# Patient Record
Sex: MALE | Race: ASIAN | NOT HISPANIC OR LATINO | URBAN - METROPOLITAN AREA
[De-identification: names, ages, dates, MRNs, and addresses within clinical notes are randomized per-mention and may not be internally consistent; named-entity substitution may affect disease eponyms.]

---

## 2019-11-12 ENCOUNTER — EMERGENCY (EMERGENCY)
Facility: HOSPITAL | Age: 64
LOS: 1 days | Discharge: HOME | End: 2019-11-12
Attending: EMERGENCY MEDICINE
Payer: COMMERCIAL

## 2019-11-12 VITALS
RESPIRATION RATE: 16 BRPM | WEIGHT: 169.98 LBS | HEART RATE: 67 BPM | HEIGHT: 72 IN | TEMPERATURE: 98 F | OXYGEN SATURATION: 97 % | SYSTOLIC BLOOD PRESSURE: 181 MMHG | DIASTOLIC BLOOD PRESSURE: 91 MMHG

## 2019-11-12 LAB
ALBUMIN SERPL ELPH-MCNC: 4.1 G/DL — SIGNIFICANT CHANGE UP (ref 3.3–5)
ALP SERPL-CCNC: 65 U/L — SIGNIFICANT CHANGE UP (ref 40–120)
ALT FLD-CCNC: 30 U/L — SIGNIFICANT CHANGE UP (ref 10–45)
ANION GAP SERPL CALC-SCNC: 10 MMOL/L — SIGNIFICANT CHANGE UP (ref 5–17)
APTT BLD: 28.6 SEC — SIGNIFICANT CHANGE UP (ref 27.5–36.3)
AST SERPL-CCNC: 27 U/L — SIGNIFICANT CHANGE UP (ref 10–40)
BASOPHILS # BLD AUTO: 0.05 K/UL — SIGNIFICANT CHANGE UP (ref 0–0.2)
BASOPHILS NFR BLD AUTO: 0.9 % — SIGNIFICANT CHANGE UP (ref 0–2)
BILIRUB SERPL-MCNC: 0.4 MG/DL — SIGNIFICANT CHANGE UP (ref 0.2–1.2)
BUN SERPL-MCNC: 16 MG/DL — SIGNIFICANT CHANGE UP (ref 7–23)
CALCIUM SERPL-MCNC: 9 MG/DL — SIGNIFICANT CHANGE UP (ref 8.4–10.5)
CHLORIDE SERPL-SCNC: 104 MMOL/L — SIGNIFICANT CHANGE UP (ref 96–108)
CHOLEST SERPL-MCNC: 167 MG/DL — SIGNIFICANT CHANGE UP (ref 10–199)
CO2 SERPL-SCNC: 24 MMOL/L — SIGNIFICANT CHANGE UP (ref 22–31)
CREAT SERPL-MCNC: 1.01 MG/DL — SIGNIFICANT CHANGE UP (ref 0.5–1.3)
EOSINOPHIL # BLD AUTO: 0.2 K/UL — SIGNIFICANT CHANGE UP (ref 0–0.5)
EOSINOPHIL NFR BLD AUTO: 3.7 % — SIGNIFICANT CHANGE UP (ref 0–6)
GLUCOSE SERPL-MCNC: 92 MG/DL — SIGNIFICANT CHANGE UP (ref 70–99)
HBA1C BLD-MCNC: 5.4 % — SIGNIFICANT CHANGE UP (ref 4–5.6)
HCT VFR BLD CALC: 43.2 % — SIGNIFICANT CHANGE UP (ref 39–50)
HDLC SERPL-MCNC: 29 MG/DL — LOW
HGB BLD-MCNC: 14.7 G/DL — SIGNIFICANT CHANGE UP (ref 13–17)
IMM GRANULOCYTES NFR BLD AUTO: 0.2 % — SIGNIFICANT CHANGE UP (ref 0–1.5)
INR BLD: 1 RATIO — SIGNIFICANT CHANGE UP (ref 0.88–1.16)
LIPID PNL WITH DIRECT LDL SERPL: 93 MG/DL — SIGNIFICANT CHANGE UP
LYMPHOCYTES # BLD AUTO: 2.34 K/UL — SIGNIFICANT CHANGE UP (ref 1–3.3)
LYMPHOCYTES # BLD AUTO: 42.8 % — SIGNIFICANT CHANGE UP (ref 13–44)
MAGNESIUM SERPL-MCNC: 2.3 MG/DL — SIGNIFICANT CHANGE UP (ref 1.6–2.6)
MCHC RBC-ENTMCNC: 26.9 PG — LOW (ref 27–34)
MCHC RBC-ENTMCNC: 34 GM/DL — SIGNIFICANT CHANGE UP (ref 32–36)
MCV RBC AUTO: 79 FL — LOW (ref 80–100)
MONOCYTES # BLD AUTO: 0.58 K/UL — SIGNIFICANT CHANGE UP (ref 0–0.9)
MONOCYTES NFR BLD AUTO: 10.6 % — SIGNIFICANT CHANGE UP (ref 2–14)
NEUTROPHILS # BLD AUTO: 2.29 K/UL — SIGNIFICANT CHANGE UP (ref 1.8–7.4)
NEUTROPHILS NFR BLD AUTO: 41.8 % — LOW (ref 43–77)
NRBC # BLD: 0 /100 WBCS — SIGNIFICANT CHANGE UP (ref 0–0)
PHOSPHATE SERPL-MCNC: 3 MG/DL — SIGNIFICANT CHANGE UP (ref 2.5–4.5)
PLATELET # BLD AUTO: 220 K/UL — SIGNIFICANT CHANGE UP (ref 150–400)
POTASSIUM SERPL-MCNC: 4.4 MMOL/L — SIGNIFICANT CHANGE UP (ref 3.5–5.3)
POTASSIUM SERPL-SCNC: 4.4 MMOL/L — SIGNIFICANT CHANGE UP (ref 3.5–5.3)
PROT SERPL-MCNC: 6.8 G/DL — SIGNIFICANT CHANGE UP (ref 6–8.3)
PROTHROM AB SERPL-ACNC: 11.5 SEC — SIGNIFICANT CHANGE UP (ref 10–12.9)
RBC # BLD: 5.47 M/UL — SIGNIFICANT CHANGE UP (ref 4.2–5.8)
RBC # FLD: 13.8 % — SIGNIFICANT CHANGE UP (ref 10.3–14.5)
SODIUM SERPL-SCNC: 138 MMOL/L — SIGNIFICANT CHANGE UP (ref 135–145)
TOTAL CHOLESTEROL/HDL RATIO MEASUREMENT: 5.8 RATIO — SIGNIFICANT CHANGE UP (ref 3.4–9.6)
TRIGL SERPL-MCNC: 223 MG/DL — HIGH (ref 10–149)
TROPONIN T, HIGH SENSITIVITY RESULT: 7 NG/L — SIGNIFICANT CHANGE UP (ref 0–51)
TROPONIN T, HIGH SENSITIVITY RESULT: 9 NG/L — SIGNIFICANT CHANGE UP (ref 0–51)
WBC # BLD: 5.47 K/UL — SIGNIFICANT CHANGE UP (ref 3.8–10.5)
WBC # FLD AUTO: 5.47 K/UL — SIGNIFICANT CHANGE UP (ref 3.8–10.5)

## 2019-11-12 PROCEDURE — 99220: CPT

## 2019-11-12 PROCEDURE — 93010 ELECTROCARDIOGRAM REPORT: CPT

## 2019-11-12 PROCEDURE — 71046 X-RAY EXAM CHEST 2 VIEWS: CPT | Mod: 26

## 2019-11-12 RX ORDER — ASPIRIN/CALCIUM CARB/MAGNESIUM 324 MG
324 TABLET ORAL ONCE
Refills: 0 | Status: COMPLETED | OUTPATIENT
Start: 2019-11-12 | End: 2019-11-12

## 2019-11-12 RX ADMIN — Medication 324 MILLIGRAM(S): at 10:39

## 2019-11-12 NOTE — ED CDU PROVIDER INITIAL DAY NOTE - ATTENDING CONTRIBUTION TO CARE
Chest pain with risk factors for ACS, will observe on telemetry, stress test, re-evaluate. Matias Sandhu M.D.

## 2019-11-12 NOTE — ED PROVIDER NOTE - OBJECTIVE STATEMENT
63 y/o M  denies PMH presents with chest pain since Sunday. States that pain is located on the L sided of chest without radiation to jaw/arm/shoulder. States pain come and goes, lasting hours at a times. Endorse that pain is worse with left arm movement and exertion. Patient has not taken medication for pain.   Denies SOB, leg swelling, N, V, fever, trauma to chest, lifting heavy objects (although states that he goes to the gym), recent travel, swelling/pain in calves.   Former smoker, quit 20+ years ago.   States that he had blood work drawn 1+ year ago, which was "normal." Has not seen cardiologist before, denies having a PCP.

## 2019-11-12 NOTE — ED CDU PROVIDER INITIAL DAY NOTE - PROGRESS NOTE DETAILS
CDU NOTE JASEN Malik: pt resting comfortably, feels well without complaint. NAD recent VSS. no events on tele. CDU PROGRESS NOTE JASEN POWELL: Received pt at 1900 sign-out. Pt resting in stretcher in NAD. Case/plan reviewed. VSS. Pt aox3, ambulatory around unit with steady gait. S1 S2 noted, RRR, lungs CTA b/l, BS x4 with soft, nontender abdomen. Pt reports having 4/10 chest discomfort described as "Gas like". Pt declines pain medication. No events on telemetry, Troponin x 2 negative.  Will continue to monitor overnight. CDU PROGRESS NOTE PA SHERRY: Pt resting comfortably, NAD, VSS. No events on telemetry. Pt denies chest pain currently and states symptoms has subsided. Will continue to monitor.

## 2019-11-12 NOTE — ED ADULT NURSE NOTE - OBJECTIVE STATEMENT
Chief Complaint: chest pain.    · Chief Complaint: The patient is a 64y Male complaining of chest pain.  · HPI Objective Statement: 63 y/o M  denies PMH presents with chest pain since Sunday. States that pain is located on the L sided of chest without radiation to jaw/arm/shoulder. States pain come and goes, lasting hours at a times. Endorse that pain is worse with left arm movement and exertion. Patient has not taken medication for pain.   	Denies SOB, leg swelling, N, V, fever, trauma to chest, lifting heavy objects (although states that he goes to the gym), recent travel, swelling/pain in calves.   	Former smoker, quit 20+ years ago.   States that he had blood work drawn 1+ year ago, which was "normal." Has not seen cardiologist before, denies having a PCP. 65 y/o male ,  denies PMH, c/o  chest pain since Sunday. States that pain is located on the L sided of chest without radiation to jaw/arm/shoulder.  Pain intermittent, lasting hours at a times. Worsening pain with movement of LUE. Has not taken any medication for the pain. Denies SOB, leg swelling, N, V, fever, trauma to chest, lifting heavy objects . Denies recent travel, swelling/pain in calves.   Hx smoking Quit x 20 years ago.

## 2019-11-12 NOTE — ED PROVIDER NOTE - NS ED ROS FT
Constitutional: No fever or chills  Eyes: No visual changes  CV: +chest pain No lower extremity edema  Resp: No SOB no cough  GI: No abd pain. No nausea or vomiting.   : No dysuria, hematuria.   MSK: No musculoskeletal pain  Skin: No rash  Psych: No complaints   Neuro: No headache. No numbness or tingling.

## 2019-11-12 NOTE — ED PROVIDER NOTE - ATTENDING CONTRIBUTION TO CARE
Patient presenting complaining of L sided chest pains for last three days.  Pain is sharp, sometimes radiates to L arm.  Notices that exertion can bring it on.  No known cardiac disease. No associated diaphoresis, nausea, vomiting.  Does not follow with doctors regularly, no cardiac testing in past, on no medications.  Former smoker, quit many years ago.  Currently chest pain free.    A 14 point review of systems is negative except as in HPI or otherwise documented.    Exam:  General: Patient well appearing, hypertensive otherwise vital signs within normal limits  HEENT: airway patent with moist mucous membranes  Cardiac: RRR S1/S2 with strong peripheral pulses  Respiratory: lungs clear without respiratory distress  GI: abdomen soft, non tender, non distended  Neuro: no gross neurologic deficits  Skin: warm, well perfused  Psych: normal mood and affect    Patient presenting with moderately concerning story for cardiac chest pain given exertional component and radiation of pain, has risk factors of age and prior smoking, as well as possible undiagnosed hypertension given vital signs on arrival - plan for labs, troponin to r/o ACS, EKG non diangostic, likely admission versus observation for further cardiac workup.

## 2019-11-12 NOTE — ED ADULT NURSE REASSESSMENT NOTE - NS ED NURSE REASSESS COMMENT FT1
Pt report received from  Mikayla BINGHAM. Pt transferred to cdu Bed 7 for NucStress . Pt a/ox3 denies respiratory distress, sob, dyspnea, C/P, palpitations, n/v/d at this time. Pt states symptoms have improved. VSS, afebrile, IV clean/dry/intact. Pt aware of plan of care, call bell within reach ,safety maintained. Will monitor and assess.

## 2019-11-12 NOTE — ED ADULT NURSE REASSESSMENT NOTE - NS ED NURSE REASSESS COMMENT FT1
Pt received from OTILIA Ross. Pt oriented to CDU & plan of care was discussed. Pt endorses 4/10 chest pain described as the feeling of gas. Pt states pain is improved Pt denies any SOB, dizziness or palpitations. Safety & comfort measures maintained. Call bell in reach. Will continue to monitor. Pt received from OTILIA Ross. Pt oriented to CDU & plan of care was discussed. Pt endorses 4/10 chest pain described as the feeling of gas. PA Hi aware. Pt does not want any pain meds at this time. Pt states pain is improved from previously. Pt denies any SOB, dizziness or palpitations. Safety & comfort measures maintained. Call bell in reach. Will continue to monitor.

## 2019-11-12 NOTE — ED CDU PROVIDER INITIAL DAY NOTE - OBJECTIVE STATEMENT
63 y/o M  denies PMH presents with chest pain since Sunday. States that pain is non-exertional and is located on the L sided of chest without radiation to jaw/arm/shoulder. States pain come and goes, lasting hours at a times. Endorse that pain is worse with left arm movement. Patient has not taken medication for pain.   Denies SOB, leg swelling, N, V, fever, trauma to chest, lifting heavy objects (although states that he goes to the gym), recent travel, swelling/pain in calves.   Former smoker, quit 20+ years ago.   States that he had blood work drawn 1+ year ago, which was "normal."   PCP Fernando

## 2019-11-12 NOTE — ED ADULT NURSE NOTE - NSIMPLEMENTINTERV_GEN_ALL_ED
Implemented All Universal Safety Interventions:  [place] to call system. Call bell, personal items and telephone within reach. Instruct patient to call for assistance. Room bathroom lighting operational. Non-slip footwear when patient is off stretcher. Physically safe environment: no spills, clutter or unnecessary equipment. Stretcher in lowest position, wheels locked, appropriate side rails in place.

## 2019-11-12 NOTE — ED PROVIDER NOTE - PHYSICAL EXAMINATION
GEN: Well Appearing, Nontoxic, NAD  HEENT: NC/AT, Symm Facies.  EOMI  CV: No JVD/Bruits or stridor;  +S1S2, RRR w/o m/g/r  RESP: CTAB w/o w/r/r  ABD: Soft, nt/nd  EXT/MSK: No lower extremity edema or calf tenderness. FROMx4  SKIN: No erythema, lesions or rash  Neuro: Grossly intact, AOX3 with normal speech, Sensation intact. Gait normal

## 2019-11-13 VITALS
OXYGEN SATURATION: 98 % | TEMPERATURE: 98 F | HEART RATE: 65 BPM | RESPIRATION RATE: 18 BRPM | SYSTOLIC BLOOD PRESSURE: 153 MMHG | DIASTOLIC BLOOD PRESSURE: 86 MMHG

## 2019-11-13 PROCEDURE — 85610 PROTHROMBIN TIME: CPT

## 2019-11-13 PROCEDURE — 85730 THROMBOPLASTIN TIME PARTIAL: CPT

## 2019-11-13 PROCEDURE — 84484 ASSAY OF TROPONIN QUANT: CPT

## 2019-11-13 PROCEDURE — 83735 ASSAY OF MAGNESIUM: CPT

## 2019-11-13 PROCEDURE — 78452 HT MUSCLE IMAGE SPECT MULT: CPT | Mod: 26

## 2019-11-13 PROCEDURE — 93016 CV STRESS TEST SUPVJ ONLY: CPT

## 2019-11-13 PROCEDURE — 99284 EMERGENCY DEPT VISIT MOD MDM: CPT | Mod: 25

## 2019-11-13 PROCEDURE — 71046 X-RAY EXAM CHEST 2 VIEWS: CPT

## 2019-11-13 PROCEDURE — 80053 COMPREHEN METABOLIC PANEL: CPT

## 2019-11-13 PROCEDURE — 78452 HT MUSCLE IMAGE SPECT MULT: CPT

## 2019-11-13 PROCEDURE — 83036 HEMOGLOBIN GLYCOSYLATED A1C: CPT

## 2019-11-13 PROCEDURE — 80061 LIPID PANEL: CPT

## 2019-11-13 PROCEDURE — G0378: CPT

## 2019-11-13 PROCEDURE — 99217: CPT

## 2019-11-13 PROCEDURE — 93018 CV STRESS TEST I&R ONLY: CPT

## 2019-11-13 PROCEDURE — 85027 COMPLETE CBC AUTOMATED: CPT

## 2019-11-13 PROCEDURE — 84100 ASSAY OF PHOSPHORUS: CPT

## 2019-11-13 PROCEDURE — 99284 EMERGENCY DEPT VISIT MOD MDM: CPT

## 2019-11-13 PROCEDURE — 93005 ELECTROCARDIOGRAM TRACING: CPT

## 2019-11-13 PROCEDURE — A9500: CPT

## 2019-11-13 PROCEDURE — 93017 CV STRESS TEST TRACING ONLY: CPT

## 2019-11-13 RX ORDER — ATORVASTATIN CALCIUM 80 MG/1
1 TABLET, FILM COATED ORAL
Qty: 7 | Refills: 0
Start: 2019-11-13 | End: 2019-11-19

## 2019-11-13 RX ORDER — ASPIRIN/CALCIUM CARB/MAGNESIUM 324 MG
1 TABLET ORAL
Qty: 7 | Refills: 0
Start: 2019-11-13 | End: 2019-11-19

## 2019-11-13 RX ORDER — METOPROLOL TARTRATE 50 MG
1 TABLET ORAL
Qty: 7 | Refills: 0
Start: 2019-11-13 | End: 2019-11-19

## 2019-11-13 RX ORDER — ISOSORBIDE MONONITRATE 60 MG/1
1 TABLET, EXTENDED RELEASE ORAL
Qty: 7 | Refills: 0
Start: 2019-11-13 | End: 2019-11-19

## 2019-11-13 RX ORDER — FAMOTIDINE 10 MG/ML
1 INJECTION INTRAVENOUS
Qty: 14 | Refills: 0
Start: 2019-11-13 | End: 2019-11-19

## 2019-11-13 NOTE — ED ADULT NURSE REASSESSMENT NOTE - NS ED NURSE REASSESS COMMENT FT1
Pt discharged as per provider. Pt verbalizes understanding to discharge orders & will follow up with PMD post discharge. IV lock removed. No bleeding noted. Pt stable upon discharge.

## 2019-11-13 NOTE — ED ADULT NURSE REASSESSMENT NOTE - NSIMPLEMENTINTERV_GEN_ALL_ED
Implemented All Universal Safety Interventions:  [place] to call system. Call bell, personal items and telephone within reach. Instruct patient to call for assistance. Room bathroom lighting operational. Non-slip footwear when patient is off stretcher. Physically safe environment: no spills, clutter or unnecessary equipment. Stretcher in lowest position, wheels locked, appropriate side rails in place.

## 2019-11-13 NOTE — ED CDU PROVIDER DISPOSITION NOTE - PATIENT PORTAL LINK FT
You can access the FollowMyHealth Patient Portal offered by [facility] by registering at the following website: [email or web address]. By joining [employer]’s FollowMyHealth portal, you will also be able to view your health information using other applications (apps) compatible with our system.

## 2019-11-13 NOTE — ED ADULT NURSE REASSESSMENT NOTE - NS ED NURSE REASSESS COMMENT FT1
Comments: 07.00 Am Pt received from OTILIA Rousseau Pt is observed for chest pain for nuclear stress test .Pt A&OX 4  . Pt still C/O chest discomfort 3/10. ., SOB, dizziness or palpitations N/V/D fever chills .. Safety & comfort measures maintained. Call bell in reach. IV site looks clean & dry   No infiltration noted Will continue to monitor.  09.00 Am pt went for nuclear stress test

## 2019-11-13 NOTE — ED CDU PROVIDER SUBSEQUENT DAY NOTE - PROGRESS NOTE DETAILS
CDU PROGRESS NOTE PA SHERRY: Pt resting comfortably, NAD, VSS. No events on telemetry. CDU NOTE JASEN MEYERS: NAD VSS.  Patient resting comfortably and has no current complaints. no events on tele. discussed plan for day with patient re: stress test. pt con't c/o mild chest pain, but no other sympt.  Awaiting Nuc Stress Test. patient at tania Michel PA-C patient at stress - Natalie Michel PA-C CDU NOTE JASEN MEYERS: NAD VSS.  Patient resting comfortably and has no current complaints. no events on tele. awaiting stress results paged cardiology regarding abn stress. - Natalie Michel PA-C CDU NOTE JASEN GOTTI: Pt resting comfortably, feeling well without complaint. No CP or abd pain. NAD, VSS. PVCs on telemetry. CV: S1S2 RRR, Lungs: CTA b/l. Cardio Dr. Conner saw pt, pt stable for d/c with aspirin and pepcid, if symptoms don't resolve with pepcid trial pt to start imdur. pt to f/u with cardio outpt for TTE. case and plan discussed with Dr. Guzman; agrees with plan; pt stable for discharge. CDU NOTE JASEN GOTTI: Pt resting comfortably, feeling well without complaint. No CP or abd pain. NAD, VSS. PVCs on telemetry. CV: S1S2 RRR, Lungs: CTA b/l. Cardio Dr. Conner saw pt, pt stable for d/c with aspirin and pepcid (as symptoms may be GI related not cardiac), if symptoms don't resolve with pepcid trial pt to start imdur. pt to f/u with cardio outpt for TTE. case and plan discussed with Dr. Guzman; agrees with plan; pt stable for discharge.

## 2019-11-13 NOTE — ED CDU PROVIDER DISPOSITION NOTE - NSFOLLOWUPINSTRUCTIONS_ED_ALL_ED_FT
1. Follow up with your PMD within 48-72 hours.   You may schedule appointment with Cardiology clinic this week by calling [phone]  2. Show copies of your reports given to you. Take all of your other medications as previously prescribed.   3. Worsening or continued chest pain, shortness of breath, weakness, return to ED. 1. Follow up with your PMD within 48-72 hours. Follow up with your cardiologist or our Cardiology clinic this week by calling [phone]. Recommend echocardiogram. Show copies of your reports given to you.   2. Start Aspirin 81mg daily and Pepcid 20mg 2x a day. If your pain does NOT resolve with pepcid, start Imdur 30mg daily.   3. Worsening or continued chest pain, shortness of breath, weakness, return to ED.

## 2019-11-13 NOTE — ED ADULT NURSE REASSESSMENT NOTE - COMFORT CARE
meal provided/plan of care explained/po fluids offered
po fluids offered/plan of care explained
ambulated to bathroom

## 2019-11-13 NOTE — ED CDU PROVIDER SUBSEQUENT DAY NOTE - HISTORY
CDU PROGRESS NOTE PA SHERRY: Pt resting comfortably, feeling well without complaint. NAD, No events on telemetry.

## 2019-11-13 NOTE — ED ADULT NURSE REASSESSMENT NOTE - NS ED NURSE REASSESS COMMENT FT1
Pt received from OTILIA Betancourt. Pt oriented to CDU & plan of care was discussed. Pt denies any chest pain, SOB, dizziness or palpitations. Pt states all symptoms have resolved. Safety & comfort measures maintained. Call bell in reach. Will continue to monitor.

## 2019-11-13 NOTE — ED CDU PROVIDER DISPOSITION NOTE - CLINICAL COURSE
65 y/o M  denies PMH presents with chest pain since Sunday. States that pain is non-exertional and is located on the L sided of chest without radiation to jaw/arm/shoulder. States pain come and goes, lasting hours at a times. Endorse that pain is worse with left arm movement. Patient has not taken medication for pain. Denies SOB, leg swelling, N, V, fever, trauma to chest, lifting heavy objects (although states that he goes to the gym), recent travel, swelling/pain in calves.   In ED, patient had ekg no signs of acute ischemia, troponin x 2 negative, chest x ray no signs of acute pathology. Pt sent to CDU for frequent reeval, vitals q 4hrs, telemetry monitoring and Nuclear stress. 63 y/o M  denies PMH presents with chest pain since Sunday. States that pain is non-exertional and is located on the L sided of chest without radiation to jaw/arm/shoulder. States pain come and goes, lasting hours at a times. Endorse that pain is worse with left arm movement. Patient has not taken medication for pain. Denies SOB, leg swelling, N, V, fever, trauma to chest, lifting heavy objects (although states that he goes to the gym), recent travel, swelling/pain in calves.   In ED, patient had ekg no signs of acute ischemia, troponin x 2 negative, chest x ray no signs of acute pathology. Pt sent to CDU for frequent reeval, vitals q 4hrs, telemetry monitoring and Nuclear stress.  In CDU, stress test showed medium-sized, moderate defects in the inferior, basal septal, and basal inferolateral walls that are mostly fixed suggestive of infarct with mild mervat-infarct ischemia. cardiology consulted. pts pain resolved. pt cleared by cardiology for discharge and outpt f/u.

## 2019-11-13 NOTE — ED CDU PROVIDER DISPOSITION NOTE - ATTENDING CONTRIBUTION TO CARE
Chest pain - Nuc Stress Test, cardiac monitor, reassess. Patient seen and examined at bedside.  Case d/w CDU PA.  Labs/imaging/ekg reviewed, cards cs appreciated.  Likely gastroesophageal reflux disease, doubt acute coronary syndrome, no indication for further w/u or treatment in CDU or inpatient.  Recommended pepcid 20 x 2 wk, start imdur if symptoms persist.  Will f/u c Dr. Arrington (cards).  Pt and family given strict return precautions including new/worsening symtoms, SOB, palps, dizziness, etc.  --BMM

## 2019-11-13 NOTE — CONSULT NOTE ADULT - SUBJECTIVE AND OBJECTIVE BOX
Cardiology Attending Consult Note      CHIEF COMPLAINT/REASON FOR CONSULT: chest pain   Date of Admission: 11-12-19    HISTORY OF PRESENT ILLNESS:    64y.o. Male with HL, now admitted with atypical chest pain x 3 days. He reports that over the last year he has had episodes of dull burning and gas like sensation in his chest associated with spicy meals, and alleviated with burping. Over the last several days he has noted similar chest discomfort (dull, burning, non-radiating, not associated with exercise, lasting several hours, 2-3/10), prompting him to come to the ED. Cardiac enzymes were negative, and he subsequently underwent and exercise MPI (11 Mets without chest pain or fatigue), which demonstrated attenuation defects, and predominantly fixed defects with mild-mervat infarct ischemia. At the time of my visit he reports feeling well.  No Chest Pain. No SOB. No HA/Dizziness. No Palpitations. No N/V/D/F/C.        ALLERGIES:  shellfish (Unknown)      MEDICATIONS:                  PAST MEDICAL & SURGICAL HISTORY:  No pertinent past medical history      FAMILY HISTORY:  Family history of acute myocardial infarction (Father)      SOCIAL HISTORY:    Remote former smoker (1/2 PPD x 10 years, quit >20 years ago), no ETOH, no IVDU    REVIEW OF SYSTEMS:    CONSTITUTIONAL: No weakness, fevers or chills  EYES/ENT: No visual changes;  No vertigo or throat pain   NECK: No pain or stiffness  RESPIRATORY: No cough, wheezing, hemoptysis; No shortness of breath  CARDIOVASCULAR: No chest pain or palpitations  GASTROINTESTINAL: No abdominal or epigastric pain. No nausea, vomiting, or hematemesis; No diarrhea or constipation. No melena or hematochezia.  GENITOURINARY: No dysuria, frequency or hematuria  NEUROLOGICAL: No numbness or weakness  SKIN: No itching, burning, rashes, or lesions   All other review of systems is negative unless indicated above.    PHYSICAL EXAM:  T(C): 36.7 (11-13-19 @ 20:00), Max: 36.8 (11-12-19 @ 23:32)  HR: 65 (11-13-19 @ 20:00) (63 - 76)  BP: 153/86 (11-13-19 @ 20:00) (109/65 - 153/86)  RR: 18 (11-13-19 @ 20:00) (18 - 18)  SpO2: 98% (11-13-19 @ 20:00) (97% - 99%)  Wt(kg): --    Drug Dosing Weight  Height (cm): 182.88 (12 Nov 2019 09:41)  Weight (kg): 77.1 (12 Nov 2019 09:41)  BMI (kg/m2): 23.1 (12 Nov 2019 09:41)  BSA (m2): 1.99 (12 Nov 2019 09:41)    I&O's Summary      Appearance: Normal	  HEENT:   Normal oral mucosa, PERRL, EOMI	  Lymphatic: No lymphadenopathy  Cardiovascular: Normal S1 S2, No JVD, No murmurs  Respiratory: Lungs clear to auscultation	  Psychiatry: A & O x 3, Mood & affect appropriate  Gastrointestinal:  Soft, Non-tender, + BS	  Skin: No rashes, No ecchymoses, No cyanosis	  Neurologic: Non-focal  Extremities: Normal range of motion, No clubbing, cyanosis or edema  Vascular: Peripheral pulses palpable 2+ bilaterally    LABS:	 	    CBC Full  -  ( 12 Nov 2019 10:45 )  WBC Count : 5.47 K/uL  Hemoglobin : 14.7 g/dL  Hematocrit : 43.2 %  Platelet Count - Automated : 220 K/uL  Mean Cell Volume : 79.0 fl  Mean Cell Hemoglobin : 26.9 pg  Mean Cell Hemoglobin Concentration : 34.0 gm/dL  Auto Neutrophil # : 2.29 K/uL  Auto Lymphocyte # : 2.34 K/uL  Auto Monocyte # : 0.58 K/uL  Auto Eosinophil # : 0.20 K/uL  Auto Basophil # : 0.05 K/uL  Auto Neutrophil % : 41.8 %  Auto Lymphocyte % : 42.8 %  Auto Monocyte % : 10.6 %  Auto Eosinophil % : 3.7 %  Auto Basophil % : 0.9 %    11-12    138  |  104  |  16  ----------------------------<  92  4.4   |  24  |  1.01    Ca    9.0      12 Nov 2019 10:45  Phos  3.0     11-12  Mg     2.3     11-12    TPro  6.8  /  Alb  4.1  /  TBili  0.4  /  DBili  x   /  AST  27  /  ALT  30  /  AlkPhos  65  11-12    PT/INR - ( 12 Nov 2019 10:45 )   PT: 11.5 sec;   INR: 1.00 ratio         PTT - ( 12 Nov 2019 10:45 )  PTT:28.6 sec  LIVER FUNCTIONS - ( 12 Nov 2019 10:45 )  Alb: 4.1 g/dL / Pro: 6.8 g/dL / ALK PHOS: 65 U/L / ALT: 30 U/L / AST: 27 U/L / GGT: x                   EKG: In the ED: EKG with NSR, no ST or TW changes, PVC, Cardiac enzymes negative x 2      Telemetry: NSR, PVC's    CXR: Clear lungs    TTE: None    Exercise MPI: 11/13/2019:    IMPRESSIONS:Abnormal Study  * Exercise capacity: 11 METS, Excellent for age and  gender.  * Chest Pain: No chest pain with exercise.  * Symptom: Fatigue.  * HR Response: Appropriate.  * BP Response: Appropriate.  * Heart Rhythm: Sinus Rhythm - Rare VPD's, Occassional  VPD's.  * Baseline ECG: Nonspecific ST-T wave abnormality.  * ECG Changes: ST Depression: 0.5 mm upsloping in leads  II, III, aVF, and V4-V6 started at 10:00 min of exercise  at HR of 150 and persisted 01:00 min into recovery.  These  changes did not meet ischemic criteria.  * Arrhythmia: Frequent VPDs occurred during rest, stress  and recovery.  * Review of raw data shows: Minor motion artifact.  * The left ventricle was normal in size. There are  medium-sized, moderate defects in the inferior, basal  septal, and basal inferolateral walls that are mostly  fixed suggestive of infarct with mild mervat-infarct  ischemia.  * There  are medium-sized, mild defects in the anterior,  basal anterolateral, and basal anteroseptal walls that are  mostly fixed, partially corect with prone imaging, and  demonstrate preserved wall motion suggestive of  attenuation artifact.  As these defects did not completely  correct with prone imaging, a small area of scar in this  territory cannot be definitively excluded.  * Post-stress gated wall motion analysis was performed  (LVEF = 65 %;LVEDV = 81 ml.), revealing mild hypokinesis  of the inferior and basal septal walls.  *** No previous Nuclear/Stress exam.    A/P:  64y.o. Male with HL, now admitted with atypical chest pain x 3 days. He reports that over the last year he has had episodes of dull burning and gas like sensation in his chest associated with spicy meals, and alleviated with burping. Over the last several days he has noted similar chest discomfort (dull, burning, non-radiating, not associated with exercise, lasting several hours, 2-3/10), prompting him to come to the ED. Cardiac enzymes were negative, and he subsequently underwent and exercise MPI (11 Mets without chest pain or fatigue), which demonstrated attenuation defects, and predominantly fixed defects with mild-mervat infarct ischemia. At the time of my visit he reports feeling well.  No Chest Pain. No SOB. No HA/Dizziness. No Palpitations. No N/V/D/F/C.      1. Chest pain - Suspect atypical chest pain in the setting of GERD.   -Doubt acute atherothrombotic event as cardiac enzymes negative x 2. EKG without significant ST or TW changes.  -Exercise MPI with area of attenuation artifact, fixed defects in the inferior/inferoseptal/inferolateral wall with mild mervat-infarct ischemia. LVEF preserved at 65%.  -Given his excellent exercise tolerance and no reproducible symptoms, I suspect his most recent chest pain is more likely 2/2 GERD.  -Will attempt trial of therapy for GERD (Pepcid 20 mg po BID) to see if pain is improved  -If no improvement in 2-3 days, would start empiric anti-anginal therapy with Imdur 30 mg po QD  -Start ASA 81 mg po QD  -Discussed with patient and length and recommended that he follow up with a cardiologist within 2-4 weeks for an echocardiogram and consideration for further ischemic workup (alternative stress test vs LHC) if persistent chest pain despite Gerd treatment and anti-anginal therapy.  -From a cardiac standpoint reasonable to DC home with outpatient cardiology follow up.     2. HL - Diet therapy at this time    Thank you for this interesting consult.  	  Zelalem Conner MD  Cardiology Attending  [facility] / [facility]  Cell: [phone]  (Cardiology Nocturnist cell number available 7 pm - 7 am every night; available daytime week days for follow-up only; daytime weekends covered by general cardiology consult service)

## 2022-03-15 ENCOUNTER — OUTPATIENT (OUTPATIENT)
Dept: OUTPATIENT SERVICES | Facility: HOSPITAL | Age: 67
LOS: 1 days | End: 2022-03-15
Payer: MEDICARE

## 2022-03-15 VITALS
WEIGHT: 182.1 LBS | RESPIRATION RATE: 16 BRPM | SYSTOLIC BLOOD PRESSURE: 146 MMHG | HEART RATE: 86 BPM | OXYGEN SATURATION: 97 % | DIASTOLIC BLOOD PRESSURE: 80 MMHG | TEMPERATURE: 98 F | HEIGHT: 70.5 IN

## 2022-03-15 DIAGNOSIS — Z12.9 ENCOUNTER FOR SCREENING FOR MALIGNANT NEOPLASM, SITE UNSPECIFIED: ICD-10-CM

## 2022-03-15 DIAGNOSIS — K63.5 POLYP OF COLON: ICD-10-CM

## 2022-03-15 DIAGNOSIS — Z98.890 OTHER SPECIFIED POSTPROCEDURAL STATES: Chronic | ICD-10-CM

## 2022-03-15 LAB
ANION GAP SERPL CALC-SCNC: 10 MMOL/L — SIGNIFICANT CHANGE UP (ref 7–14)
BUN SERPL-MCNC: 15 MG/DL — SIGNIFICANT CHANGE UP (ref 7–23)
CALCIUM SERPL-MCNC: 9.2 MG/DL — SIGNIFICANT CHANGE UP (ref 8.4–10.5)
CHLORIDE SERPL-SCNC: 106 MMOL/L — SIGNIFICANT CHANGE UP (ref 98–107)
CO2 SERPL-SCNC: 25 MMOL/L — SIGNIFICANT CHANGE UP (ref 22–31)
CREAT SERPL-MCNC: 1.09 MG/DL — SIGNIFICANT CHANGE UP (ref 0.5–1.3)
EGFR: 74 ML/MIN/1.73M2 — SIGNIFICANT CHANGE UP
GLUCOSE SERPL-MCNC: 122 MG/DL — HIGH (ref 70–99)
HCT VFR BLD CALC: 42.7 % — SIGNIFICANT CHANGE UP (ref 39–50)
HGB BLD-MCNC: 14.7 G/DL — SIGNIFICANT CHANGE UP (ref 13–17)
MCHC RBC-ENTMCNC: 27.8 PG — SIGNIFICANT CHANGE UP (ref 27–34)
MCHC RBC-ENTMCNC: 34.4 GM/DL — SIGNIFICANT CHANGE UP (ref 32–36)
MCV RBC AUTO: 80.9 FL — SIGNIFICANT CHANGE UP (ref 80–100)
NRBC # BLD: 0 /100 WBCS — SIGNIFICANT CHANGE UP
NRBC # FLD: 0 K/UL — SIGNIFICANT CHANGE UP
PLATELET # BLD AUTO: 219 K/UL — SIGNIFICANT CHANGE UP (ref 150–400)
POTASSIUM SERPL-MCNC: 3.5 MMOL/L — SIGNIFICANT CHANGE UP (ref 3.5–5.3)
POTASSIUM SERPL-SCNC: 3.5 MMOL/L — SIGNIFICANT CHANGE UP (ref 3.5–5.3)
RBC # BLD: 5.28 M/UL — SIGNIFICANT CHANGE UP (ref 4.2–5.8)
RBC # FLD: 15.4 % — HIGH (ref 10.3–14.5)
SODIUM SERPL-SCNC: 141 MMOL/L — SIGNIFICANT CHANGE UP (ref 135–145)
WBC # BLD: 5.62 K/UL — SIGNIFICANT CHANGE UP (ref 3.8–10.5)
WBC # FLD AUTO: 5.62 K/UL — SIGNIFICANT CHANGE UP (ref 3.8–10.5)

## 2022-03-15 PROCEDURE — 93010 ELECTROCARDIOGRAM REPORT: CPT

## 2022-03-15 RX ORDER — VITAMIN E 100 UNIT
0 CAPSULE ORAL
Qty: 0 | Refills: 0 | DISCHARGE

## 2022-03-15 RX ORDER — ASCORBIC ACID 60 MG
1 TABLET,CHEWABLE ORAL
Qty: 0 | Refills: 0 | DISCHARGE

## 2022-03-15 RX ORDER — LATANOPROST 0.05 MG/ML
1 SOLUTION/ DROPS OPHTHALMIC; TOPICAL
Qty: 0 | Refills: 0 | DISCHARGE

## 2022-03-15 RX ORDER — OMEGA-3 ACID ETHYL ESTERS 1 G
0 CAPSULE ORAL
Qty: 0 | Refills: 0 | DISCHARGE

## 2022-03-15 NOTE — H&P PST ADULT - PROBLEM SELECTOR PLAN 1
Colonoscopy.    CBC BMP EKG    Preop instructions given and explained    MARTINA precautions per Stop Bang Questionnaire criteria

## 2022-03-15 NOTE — H&P PST ADULT - ASSESSMENT
67 y/o male with hx of colon polyps, s/p recent colonoscopy Feb. 2022 with benign polyps  which were removed at the time.  Pt reports polyp remains which required special instrument to removed.  Scheduled now for Colonoscopy.

## 2022-03-15 NOTE — H&P PST ADULT - GASTROINTESTINAL COMMENTS
65 y/o male with hx of colon polyps, s/p recent colonoscopy Feb. 2022 with benign polyps  which were removed at the time.  Pt reports polyp remains which required special instrument to removed.  Scheduled now for Colonoscopy.

## 2022-03-15 NOTE — H&P PST ADULT - NS MD [facility] PULSE RADIAL
UROLOGY CONSULT    Patient Name: Javi Hernandez  Patient MRN: [id number]  Date of Service: 3/16/2019   Date / Time Note Created: 3/16/2019 12:42 PM   Referring Provider: Dr Marleny Suárez  Provider Creating Note: Miguelina Hall MD    PCP: Rhiannon Robertson  Attending Provider:  Micky Cherry MD    Reason for Consult:  Bleeding per urethra    HPI   Source:the patient  The patient and his brother noticed some bleeding from his urethral meatus after sitting and performing a function on his bedside commode  There was some possibility of mild trauma to the urethra during the transfer to the commode  There was no hematuria appreciated  Patient has been voiding without any symptoms or problems  Impressions  Bleeding per urethra, probably manipulation related  , possibly exacerbated to his intrinsic coagulopathy    Recommendations  Observation 1  Correct coagulopathy and hold anticoagulation ( if applicable) 2  ATBs empirically & await urine cultures  3  Will follow & update  No past medical history on file  Past Surgical History:   Procedure Laterality Date    IR ABSCESS/SEROMA DRAINAGE  2/28/2019    IR CHEST TUBE  3/6/2019    IR SUPRAPUBIC TUBE  3/14/2019       No family history on file  Social History     Socioeconomic History    Marital status: Single     Spouse name: Not on file    Number of children: Not on file    Years of education: Not on file    Highest education level: Not on file   Occupational History    Not on file   Social Needs    Financial resource strain: Not on file    Food insecurity:     Worry: Not on file     Inability: Not on file    Transportation needs:     Medical: Not on file     Non-medical: Not on file   Tobacco Use    Smoking status: Former Smoker    Smokeless tobacco: Never Used   Substance and Sexual Activity    Alcohol use:  Yes    Drug use: Not on file    Sexual activity: Not on file   Lifestyle    Physical activity:     Days per week: Not on file Minutes per session: Not on file    Stress: Not on file   Relationships    Social connections:     Talks on phone: Not on file     Gets together: Not on file     Attends Yazidi service: Not on file     Active member of club or organization: Not on file     Attends meetings of clubs or organizations: Not on file     Relationship status: Not on file    Intimate partner violence:     Fear of current or ex partner: Not on file     Emotionally abused: Not on file     Physically abused: Not on file     Forced sexual activity: Not on file   Other Topics Concern    Not on file   Social History Narrative    Not on file       No Known Allergies    Review of Systems  10 point review of systems negative except as noted in HPI   Constitutional: Negative  HENT: Negative  Eyes: Negative for pain and visual disturbance  Respiratory: Negative  Cardiovascular: Negative  Gastrointestinal: Negative  Endocrine: Negative  Genitourinary: Negative  Musculoskeletal: Negative  Skin: Positive for wound  Allergic/Immunologic: Negative  Neurological: Negative for dizziness, light-headedness and headaches  Hematological: Negative  Psychiatric/Behavioral: Negative  Chart Review   Allergies, current medications, history, problem list    Vital Signs  /62 (BP Location: Left arm)   Pulse 80   Temp 99 1 °F (37 3 °C) (Temporal)   Resp 18   Ht 5' 8" (1 727 m)   Wt 102 kg (224 lb 13 9 oz)   SpO2 96%   BMI 34 19 kg/m²     Physical Exam  Gen:  Pleasant, non-tachypnic, non-dyspnic  Conversant  Heart: regular rate and rhythm, S1S2 present, no murmur, rub or gallop  Lungs:  Decreased air entry right base  Abd: soft, non-tender, non-distended  NABS, no guarding, rebound or peritoneal signs  :  Penis normal   No evidence of bleeding at the urethral meatus  Testicles normal nontender  Extremities: no clubbing, cyanosis or edema  2+pedal pulses bilaterally   Full range of motion  Neuro: awake, alert and oriented  Cranial nerves 2-12 intact  Strength and sensation grossly intact  Skin: warm and dry: no petechiae, purpura and rash  Laboratory Studies  Lab Results   Component Value Date    K 3 3 (L) 03/16/2019     03/16/2019    CO2 26 03/16/2019    CREATININE 0 54 (L) 03/16/2019    BUN 6 03/16/2019    MG 1 7 03/15/2019     Lab Results   Component Value Date    WBC 4 13 (L) 03/16/2019    RBC 2 51 (L) 03/16/2019    HGB 7 9 (L) 03/16/2019    HCT 25 1 (L) 03/16/2019     (H) 03/16/2019    MCH 31 5 03/16/2019    RDW 19 7 (H) 03/16/2019     (L) 03/16/2019       Imaging and Other Studies  )Xr Chest Portable    Result Date: 3/10/2019  Narrative: CHEST INDICATION:   vdrf  COMPARISON:  One view chest 3/9/2019 EXAM PERFORMED/VIEWS:  XR CHEST PORTABLE FINDINGS:  Lines and tubes are unchanged from prior exam  Cardiac silhouette partially obscured  Bibasilar and right middle lobe airspace disease right greater than left  Improved aeration in the left lung base  Bilateral pleural effusions right larger than left  No significant interval change  No discernible pneumothorax  No pulmonary edema  Osseous structures appear within normal limits for patient age  Impression: Decreased airspace disease and improved aeration left lung base  Otherwise, no significant interval change  Workstation performed: [id number]     Xr Chest Portable    Result Date: 3/10/2019  Narrative: CHEST INDICATION:   hypoxia  COMPARISON:  3/7/2019 EXAM PERFORMED/VIEWS:  XR CHEST PORTABLE FINDINGS:  ET tube projects appropriately  There is a right internal jugular central line projecting over the inferior aspect of the right clavicular head  NG tube extends into the stomach  There is a pigtail drain projecting over the mid abdomen  There is a pigtail drain within the inferior lateral aspect of the right hemithorax which is unchanged  Cardiomediastinal silhouette appears unremarkable   Persistent bilateral basilar opacities and effusions, right greater than left  There is no pneumothorax identified  Osseous structures appear within normal limits for patient age  Impression: Persistent bilateral basilar opacities and effusions, right greater than left  Pigtail chest drain on the right unchanged in position  No pneumothorax  Workstation performed: [id number]     Xr Chest Portable    Result Date: 3/7/2019  Narrative: CHEST INDICATION:   f/u chest tube with pleural effusion  COMPARISON:  Chest x-ray on 3/6/2019  EXAM PERFORMED/VIEWS:  XR CHEST PORTABLE FINDINGS:  Endotracheal tube is present, in satisfactory position with its tip 4 2 cm above the level of the kalina  Enteric tube is present with its tip extending below the left hemidiaphragm  Right IJ line terminates in the superior vena cava  Right-sided chest tube again noted  Heart shadow is enlarged but unchanged from prior exam  Increased right basilar consolidation and/or pleural effusion with associated atelectasis  Persistent left basilar consolidation  No pneumothorax  Osseous structures appear within normal limits for patient age  Impression: Increased right basilar consolidation and/or pleural effusion with associated atelectasis  Persistent left basilar consolidation  No pneumothorax  Workstation performed: [id number]     Xr Chest Portable    Result Date: 3/6/2019  Narrative: CHEST INDICATION:   hypoxia  Ventilator dependent  COMPARISON:  Chest x-ray 3/1/2019 EXAM PERFORMED/VIEWS:  XR CHEST PORTABLE FINDINGS:  Lines and tubes are unchanged from prior exam  Heart shadow is enlarged but unchanged from prior exam  Persistent left basilar consolidation and worsening right basilar consolidation and effusion  No pneumothorax with right chest tube in place  Osseous structures appear within normal limits for patient age  Impression: Persistent left basilar consolidation and worsening right basilar consolidation and effusion    No pneumothorax with right chest tube in place  The study was marked in [facility] for immediate notification  Workstation performed: [id number]     Xr Chest Portable    Result Date: 3/1/2019  Narrative: EXAMINATION: XR CHEST PORTABLE, XR CHEST PORTABLE, XR CHEST PORTABLE INDICATION:   s/p intubation  COMPARISON:  X-ray same day VIEWS:  XR CHEST PORTABLE, XR CHEST PORTABLE, XR CHEST PORTABLE FINDINGS: 2/28/19 at 4:23 PM:ET tube tip is 3 6 cm above the kalina  OG tube tip is not seen  Heart shadow is obscured by adjacent opacity  There is near complete opacification of the right hemithorax  Left lung is mostly clear  Trachea is midline  2/28/19 at 9:17 PM: Placement of right pleural tube and right IJ catheter tip at upper SVC  There is improved aeration in the right lung  3/1/19 at 4:49 AM: Unchanged lines and tubes  Significant improvement within the right hemithorax, with small/moderate right pleural effusion and adjacent opacity  Retrocardiac opacity noted  Impression: 1  Significant improvement in right hemithorax opacification from effusion/atelectasis, this is small-to-moderate on the final image  2   Retrocardiac opacity  3   Appropriate right IJ catheter  The study was marked in [facility] for immediate notification  Workstation performed: [id number]     Xr Chest Portable    Result Date: 3/1/2019  Narrative: EXAMINATION: XR CHEST PORTABLE, XR CHEST PORTABLE, XR CHEST PORTABLE INDICATION:   s/p intubation  COMPARISON:  X-ray same day VIEWS:  XR CHEST PORTABLE, XR CHEST PORTABLE, XR CHEST PORTABLE FINDINGS: 2/28/19 at 4:23 PM:ET tube tip is 3 6 cm above the kalina  OG tube tip is not seen  Heart shadow is obscured by adjacent opacity  There is near complete opacification of the right hemithorax  Left lung is mostly clear  Trachea is midline  2/28/19 at 9:17 PM: Placement of right pleural tube and right IJ catheter tip at upper SVC  There is improved aeration in the right lung  3/1/19 at 4:49 AM: Unchanged lines and tubes    Significant improvement within the right hemithorax, with small/moderate right pleural effusion and adjacent opacity  Retrocardiac opacity noted  Impression: 1  Significant improvement in right hemithorax opacification from effusion/atelectasis, this is small-to-moderate on the final image  2   Retrocardiac opacity  3   Appropriate right IJ catheter  The study was marked in [facility] for immediate notification  Workstation performed: [id number]     Xr Chest Portable    Result Date: 3/1/2019  Narrative: EXAMINATION: XR CHEST PORTABLE, XR CHEST PORTABLE, XR CHEST PORTABLE INDICATION:   s/p intubation  COMPARISON:  X-ray same day VIEWS:  XR CHEST PORTABLE, XR CHEST PORTABLE, XR CHEST PORTABLE FINDINGS: 2/28/19 at 4:23 PM:ET tube tip is 3 6 cm above the kalina  OG tube tip is not seen  Heart shadow is obscured by adjacent opacity  There is near complete opacification of the right hemithorax  Left lung is mostly clear  Trachea is midline  2/28/19 at 9:17 PM: Placement of right pleural tube and right IJ catheter tip at upper SVC  There is improved aeration in the right lung  3/1/19 at 4:49 AM: Unchanged lines and tubes  Significant improvement within the right hemithorax, with small/moderate right pleural effusion and adjacent opacity  Retrocardiac opacity noted  Impression: 1  Significant improvement in right hemithorax opacification from effusion/atelectasis, this is small-to-moderate on the final image  2   Retrocardiac opacity  3   Appropriate right IJ catheter  The study was marked in [facility] for immediate notification  Workstation performed: [id number]     Xr Chest Portable    Result Date: 2/28/2019  Narrative: CHEST INDICATION:   hypoxia  COMPARISON:  Chest x-ray same day at 3:47 AM EXAM PERFORMED/VIEWS:  XR CHEST PORTABLE FINDINGS: Lung volumes are low  No pneumothorax is seen  Some prominence of the cardiac and mediastinal contours is redemonstrated, probably exaggerated by low lung volumes   Dense hypoaeration of the right lower lung field with blunting of the costophrenic sulcus is redemonstrated, suspect pleural effusion/atelectasis  Some pleural fluid seen tracking into the minor fissure as well as along the convexity of the right hemithorax toward the apex  The left lung appears grossly clear  The bones are stable in appearance  There has been no other significant interval change  Impression: Right-sided pleural effusion/atelectasis suspected  Superimposed infection could be considered in the appropriate clinical setting  Other findings as above  No significant interval change from the most recent prior  Workstation performed: [id number]     Xr Chest Portable    Result Date: 2/28/2019  Narrative: CHEST INDICATION:   hypoxia  COMPARISON:  CT chest, abdomen, and pelvis dated 2/26/2019 EXAM PERFORMED/VIEWS:  XR CHEST PORTABLE FINDINGS: Lung volumes are low  No pneumothorax is seen  Some mild prominence of the cardiac and mediastinal contours is noted, probably exaggerated by low lung volumes  The left lung appears grossly clear  There is dense hypoaeration of the right lower lung field and the costophrenic sulcus, suspicious for pleural effusion/atelectasis with some pleural fluid seen tracking along the convexity of the right hemithorax toward the apex  There is some hazy opacification of the remainder of the right lung which likely represents layering pleural fluid  Superimposed infection could be considered in the appropriate clinical setting  The visualized bones appear intact  Impression: Suspected moderate-sized right pleural effusion/atelectasis as described, possibly a hepatic hydrothorax  Superimposed infection could be considered in the appropriate clinical setting  Other findings as above  Workstation performed: [id number]     Xr Chest Pa & Lateral    Result Date: 3/15/2019  Narrative: CHEST INDICATION:   empyema   COMPARISON:  3/10/2019 EXAM PERFORMED/VIEWS:  XR CHEST PA & LATERAL FINDINGS:  2 right-sided chest tubes are noted best appreciated on the lateral view in the right hemithorax  There is no pneumothorax  Heart shadow is obscured by adjacent opacity  The lower half of the right lung field is opacified with layering radiodensity likely pleural effusion  Superimposed atelectasis and/or empyema excluded  Increased opacity in the upper lung fields may represent layering effusion or congestion  Left lung clear  The right IJ catheter previously present as well as the endotracheal and nasogastric tubes have been removed  Osseous structures stable  Impression: Moderate amount of persistent opacification in the right hemithorax with right chest tubes redemonstrated  Workstation performed: [id number]     Xr Chest Pa & Lateral    Result Date: 2/20/2019  Narrative: CHEST INDICATION:   R05: Cough  dry cough 12/25/18, within the last two weeks, cough has become productive, intermittent fever, shortness of breath, loss of energy, fatigue and slight chest tightness, no sx COMPARISON:  None EXAM PERFORMED/VIEWS:  XR CHEST PA & LATERAL FINDINGS: Cardiomediastinal silhouette appears unremarkable  The lungs are clear  No pneumothorax or pleural effusion  Osseous structures appear within normal limits for patient age  Impression: No acute cardiopulmonary disease  Workstation performed: [id number]     Ir Tube Check    Result Date: 3/12/2019  Narrative: Drainage catheter evaluation Indication: Liver abscess  Decreased output  Procedure: The liver drainage catheter was hand injected with contrast and fluoroscopic spot images were obtained  Contrast: 5 cc Omnipaque-300 Fluoroscopy time: 0 7 minutes Images: 3 Findings: The catheter remains in satisfactory position   There is a small residual collection slightly larger than the catheter loop which decompresses well  Irrigated fluid was mostly clear with a small amount of debris  Impression: Impression: Near resolution of liver abscess   Plan: Discontinue flushing  Follow-up tube check in one week with possible removal   Patient to get a contrast enhanced CT tomorrow for further confirmation of abscess resolution as well as assessment of right pleural fluid collection  Workstation performed: [id number]     Xr Abdomen 1 Vw Portable    Result Date: 3/11/2019  Narrative: ABDOMEN INDICATION:   suprapubic pubic catheter placement  COMPARISON:  None VIEWS:  AP supine FINDINGS: A pocket of contrast is seen within the pelvis and not conforming to the expected contours of the bladder  There is a nonobstructive bowel gas pattern  No discernible free air on this supine study  Upright or left lateral decubitus imaging is more sensitive to detect subtle free air in the appropriate setting  No pathologic calcifications or soft tissue masses  Visualized osseous structures are unremarkable for the patient's age  Impression: Injected contrast seen within pelvis and not conforming to the expected contours of the bladder, suspicious for extravesicular position  Findings discussed with RADHA Fonseca at 1:59 AM, 3/11/2019 Workstation performed: [id number]     Ct Chest Abdomen Pelvis W Contrast    Result Date: 3/12/2019  Narrative: CT CHEST, ABDOMEN AND PELVIS WITH IV CONTRAST INDICATION:   Sepsis  COMPARISON:  3/4/2019 TECHNIQUE: CT examination of the chest, abdomen and pelvis was performed  Axial, sagittal, and coronal 2D reformatted images were created from the source data and submitted for interpretation  Radiation dose length product (DLP) for this visit:  1148 mGy-cm   This examination, like all CT scans performed in the [facility], was performed utilizing techniques to minimize radiation dose exposure, including the use of iterative reconstruction and automated exposure control  IV Contrast:  50 mL of iohexol (OMNIPAQUE) 100 mL of iohexol (OMNIPAQUE) Enteric Contrast: Enteric contrast was administered   FINDINGS: CHEST LUNGS:  No endotracheal or endobronchial lesion  Right lower and middle lobe consolidation with air is again noted  Left basilar atelectasis is noted  Small bilateral effusions and areas of pleural thickening on the right concerning for an empyema  A right pleural drainage catheters noted  PLEURA:  Unremarkable  HEART/GREAT VESSELS:  Unremarkable for patient's age  MEDIASTINUM AND ILDA:  Unremarkable  CHEST WALL AND LOWER NECK:   Unremarkable  ABDOMEN LIVER/BILIARY TREE:  Liver is again noted to be cirrhotic  Pigtail catheter traversing the left hepatic lobe with drain noted coiling in the center of a loculated abscess in the caudate lobe is again seen; it is smaller than on the prior exam however still present now measuring 4 0 x 5 3 cm in comparison to 6 9 x 6 7 cm on the most recent prior exam  GALLBLADDER:  No calcified gallstones  No pericholecystic inflammatory change  SPLEEN:  Splenomegaly is again noted  Peripheral spleen demonstrates heterogeneous enhancement concerning for a splenic infarct similar to the prior exam  PANCREAS:  Unremarkable  ADRENAL GLANDS:  Unremarkable  KIDNEYS/URETERS:  Unremarkable  No hydronephrosis  STOMACH AND BOWEL:  Bowel loops are diffusely dilated without transition point concerning for ileus  APPENDIX:  Appendix appears similar to the recent prior exam  ABDOMINOPELVIC CAVITY:  Moderate to large volume ascites most prominent in the pelvis right upper quadrant    No lymphadenopathy  VESSELS:  Unremarkable for patient's age  PELVIS REPRODUCTIVE ORGANS:  Unremarkable for patient's age  URINARY BLADDER:  Unremarkable  ABDOMINAL WALL/INGUINAL REGIONS:  Unremarkable  OSSEOUS STRUCTURES:  No acute fracture or destructive osseous lesion  Impression: Hepatic abscess is again noted and is decreased in size however not resolved  Right middle and lower lobe consolidations are again noted with air bronchograms A pigtail catheter and a pleural drainage catheter noted within the right pleural space    There is a small to moderate right pleural effusion which demonstrates thickened enhancing pleura concerning for an empyema  Dependent atelectasis is noted in the left lung base with a small effusion  No air bronchograms on the left  Oral contrast was administered; the bowel loops are diffusely dilated throughout the abdomen; favor ileus over obstruction  Moderate ascites  Persistent splenomegaly with an of triangular area of decreased splenic enhancement similar to the prior exam concerning for a splenic infarct  Appendix appears similar to the recent prior exam  The study was marked in [facility] for immediate notification  Workstation performed: [id number]     Ct Chest Abdomen Pelvis W Contrast    Result Date: 2/25/2019  Narrative: CT CHEST, ABDOMEN AND PELVIS WITH IV CONTRAST INDICATION:   failiure to thrive, rule out malignancy, acute processes  COMPARISON:  Chest x-ray 2/19/2019 TECHNIQUE: CT examination of the chest, abdomen and pelvis was performed  Axial, sagittal, and coronal 2D reformatted images were created from the source data and submitted for interpretation  Radiation dose length product (DLP) for this visit:  1151 mGy-cm   This examination, like all CT scans performed in the [facility], was performed utilizing techniques to minimize radiation dose exposure, including the use of iterative reconstruction and automated exposure control  IV Contrast:  50 mL of iohexol (OMNIPAQUE) 100 mL of iohexol (OMNIPAQUE) Enteric Contrast: Enteric contrast was administered  FINDINGS: CHEST LUNGS:  With the exception of some mild atelectasis dependently and in the bilateral lung bases, the lungs appear grossly clear  PLEURA:  Unremarkable  HEART/GREAT VESSELS: Minimal coronary atherosclerosis  The heart appears normal in size  No aortic aneurysm  MEDIASTINUM AND ILDA:  Grossly unremarkable CHEST WALL AND LOWER NECK:   Unremarkable   ABDOMEN LIVER/BILIARY TREE:  Subtle nodular contour and heterogeneity of the liver suspicious for hepatic cirrhosis  There is enlargement of the caudate lobe which contains an ill-defined and heterogeneous mass lesion with large internal areas of hypodensity and internal septations  Some rim enhancement is suspected this measures approximately 10 5 x 9 4 x 11 8 cm in size (axial image 55, series 2)  There is some associated mass effect on the IVC, although this remains patent  GALLBLADDER:  Physiologically distended  Some gallbladder wall thickening is noted, probably secondary to chronic liver disease  SPLEEN:  Enlarged  Splenic volume is estimated at 1530 mL  No discrete splenic lesion is seen  PANCREAS:  Unremarkable  ADRENAL GLANDS:  Unremarkable  KIDNEYS/URETERS:  Grossly unremarkable; no hydronephrosis  STOMACH AND BOWEL:  Grossly unremarkable; no evidence of bowel obstruction  APPENDIX:  Fluid-filled and dilated (~1 7 cm) with thickening of the appendiceal wall as well as some periappendiceal fat stranding and fluid, suspicious for acute appendicitis in the appropriate clinical setting  ABDOMINOPELVIC CAVITY:  Small amount of fluid tracks into the right paracolic gutter  No discrete intraperitoneal free air  Some prominent lymph nodes are seen in the upper abdomen and retroperitoneum, largest measuring approximately 1 4 cm in size  VESSELS:  Some mesenteric and perisplenic collateral vessels/varices are noted  No aortic aneurysm  The splenic vein, portal vein, and SMV are patent  PELVIS REPRODUCTIVE ORGANS:  Unremarkable for patient's age  URINARY BLADDER:  Bladder is partially distended  Some mild circumferential bladder wall thickening is noted, probably exaggerated by underdistention  ABDOMINAL WALL/INGUINAL REGIONS:  Unremarkable  OSSEOUS STRUCTURES:  No acute fracture or destructive osseous lesion       Impression: Fluid-filled and dilated appendix with appendiceal wall thickening and periappendiceal fat stranding and fluid, suspicious for acute appendicitis in the appropriate clinical setting  Hepatic cirrhosis and splenomegaly, with associated perisplenic and mesenteric varices suggesting a component of portal hypertension  Enlargement of the caudate lobe which contains an ill-defined and heterogeneous mass lesion with large internal areas of hypodensity and internal septations as described, this measures approximately 10 5 x 9 4 x 11 8 cm in size (axial image 55, series 2)  Primary differential consideration is hepatic abscess in the appropriate clinical setting, although hepatic neoplasm such as hepatocellular carcinoma is also considered  Correlation with the patient's symptoms and laboratory values recommended  Prominent lymph nodes in the upper abdomen and retroperitoneum, largest measures approximately 1 4 cm in size, possibly reactive  Partially distended bladder  Mild circumferential bladder wall thickening noted, probably exaggerated by underdistention  Superimposed cystitis could be considered in the appropriate clinical setting  Other findings as above  Findings discussed with Dr Pam Santa by Dr Terra Lama at 11:45 PM on 2/25/2019  Workstation performed: [id number]     Ir Tube Change    Result Date: 3/6/2019  Narrative: Liver abscess drain check, change and repositioning Clinical history: Liver abscess and right lung infected fluid Contrast: 40 mL of Omnipaque Fluoro time: Number of Images: 10 Conscious sedation time: None Procedure: The patient was brought to the interventional radiology suite, identified  The patient was placed supine on the table  The previous drainage catheter site was prepped and draped in usual sterile fashion  A  view was obtained  Contrast was injected, and a drain check was performed demonstrating that the drain is in the communication with the entire liver abscess  Next, a wire was placed and attempt was made to reposition the tube more inferiorly without success    It was difficult to find the inferior portion of the collection since it is amorphous  Attempt at doing this was terminated since I did not want to lose completely access  I placed the tube back into the original position since it is draining all of the liver collections very well  Another 10-Hebrew pigtail tube was placed and sutured to the skin and placed to bag drainage  The patient tolerated the procedure well, with no immediate complications seen  Impression: Impression: 1  Drain check demonstrated the existing tube in communication with the entire liver abscess  Attempt was made to reposition the tube into the more inferior portion of the abscess without success due to the amorphous nature of the collection  A 2nd drainage tube is not possible due to the location of the fluid collection deep in the abdomen surrounded by multiple organs  The collection is markedly smaller in size compared with the previous CAT scan and I believe that's all of these collections are  being drained by this catheter  Plan: I recommend performing a CT scan of the chest abdomen pelvis in one week to reassess both the right empyema and this liver abscess  If the abscess is still present and has not decreased in size markedly, we could reattempt repositioning of the catheter more inferiorly in the collection  Otherwise I would allow this catheter to continue draining since the collection is a very small fraction of the original size Workstation performed: [id number]     Ct Abdomen Pelvis W Contrast    Result Date: 3/4/2019  Narrative: CT ABDOMEN AND PELVIS WITH IV CONTRAST INDICATION:   Liver abscess  Abnormal ERCP  COMPARISON:  February 25, 2019 TECHNIQUE:  CT examination of the abdomen and pelvis was performed  Scanning through the abdomen was performed in arterial, venous and delayed phases according a protocol spefically designed to evaluate upper abdominal viscera    Axial, sagittal, and coronal 2D reformatted images were created from the source data and submitted for interpretation  Radiation dose length product (DLP) for this visit:  2386 mGy-cm   This examination, like all CT scans performed in the [facility], was performed utilizing techniques to minimize radiation dose exposure, including the use of iterative reconstruction and automated exposure control  IV Contrast:  100 mL of iohexol (OMNIPAQUE) Enteric Contrast:  Enteric contrast was administered  FINDINGS: ABDOMEN LOWER CHEST:  Chest tube is noted in the posterior lower right pleural space, and there is pneumothorax, loculated in the subpulmonic space in the lower right chest   Also noted is a loculated right pleural effusion with overlying compressive atelectasis, and dependent atelectasis noted at left lung base  LIVER/BILIARY TREE:  Liver again demonstrates cirrhotic morphology  There is a pigtail drainage catheter traversing the left hepatic lobe with the pigtail drain coiling in the center of the loculated abscess with its epicenter in the caudate lobe of the  liver  The abscess cavity again demonstrates a loculated appearance but the overall size of the cavity is markedly diminished when compared to the examination of February 25, 2019  Overall, the abscess is estimated at approximately 6 9 x 6 7 cm on image 26 of series 6 as compared with approximately 10 6 x 9 4 cm when measured using similar technique on the February 25, 2019 examination  GALLBLADDER:  No calcified gallstones  No pericholecystic inflammatory change  SPLEEN:  The spleen is enlarged measuring up to 18 cm  There are perisplenic varices  Splenic vein is patent  There is heterogeneous enhancement within the periphery of the spleen on arterial phase imaging  Findings are consistent with portal hypertension  No discrete splenic mass or splenic infarction noted  PANCREAS:  Unremarkable  ADRENAL GLANDS:  Unremarkable  KIDNEYS/URETERS:  Unremarkable  No hydronephrosis   STOMACH AND BOWEL:  There is a nasogastric tube passing through the stomach with its tip in the 3rd portion of the duodenum  The gastroesophageal junction is inseparable from the left superior margin of the hepatic abscess, as on previous examination  No abnormal bowel wall thickening or pneumatosis  No bowel obstruction  APPENDIX:  There is abnormal thickening and enhancement of the appendix which measures up to 14 cm and is unchanged when compared to the February 25, 2019 examination  This is a nonspecific nonspecific finding  Although there is edema in the periappendiceal peritoneal fat, the edema in the periappendiceal fat is no more significant than the mesenteric edema throughout the abdomen and pelvis in this patient with cirrhosis and ascites  However, there was more severe periappendiceal edema on examination of February 25, 2019  ABDOMINOPELVIC CAVITY:  Diffuse mesenteric edema with moderate volume of lower abdomen and pelvis ascites  Superior mesenteric vein appears patent  There is reactive periportal and portacaval lymph nodes  No retroperitoneal brittany adenopathy  VESSELS:  Portal hypertension as evidenced by perisplenic and periceliac varices  No abdominal aortic aneurysm  PELVIS REPRODUCTIVE ORGANS:  Unremarkable for patient's age  URINARY BLADDER:  Bubble of gas is noted within the lumen of the urinary bladder  ABDOMINAL WALL/INGUINAL REGIONS:  Small fat-containing umbilical hernia  Extensive body wall edema is new finding when compared to February 25, 2019  OSSEOUS STRUCTURES:  No acute fracture or destructive osseous lesion  Impression: Multiloculated hepatic abscess, significantly decreased in size since February 25, 2019 but not completely collapsed  A pigtail drainage catheter is seen within the central portion of the abscess, the epicenter of which is in the caudate lobe of the liver  Hepatic cirrhosis  Portal hypertension as evidenced by splenomegaly as well as perisplenic and periceliac varices    Main portal vein, splenic vein, and superior mesenteric veins are patent  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  Interval development of extensive mesenteric edema and body wall edema as well as small to moderate volume of ascites, predominantly in the lower abdomen and pelvis  A right-sided chest tube is present and there is loculated hydropneumothorax in the base of the right hemithorax  Compressive atelectasis is noted at right lung base and dependent atelectasis noted left lung base  Enteric tube is noted within the stomach  The region of the gastroesophageal junction is inseparable from the abscess along the undersurface of the caudate lobe of the liver  As on previous examination there is abnormal thickening of the appendix  Although there is periappendiceal edema, this is no more significant than the mesenteric edema seen throughout the abdomen in this patient with interval development of ascites  The possibility that this represents acute appendicitis or possibly mucinous appendiceal neoplasm cannot be excluded and interval clinical and imaging follow-up of this finding is recommended  Workstation performed: [id number]     Ir Abscess/seroma Drainage    Result Date: 3/1/2019  Narrative: IMAGE-GUIDED PERCUTANEOUS DRAINAGE Indication: Septic shock  Liver collection suspected abscess  Procedure: In the supine position, a suitable location for puncture was identified with CT  1% lidocaine was used for local anesthetic  Using CT guidance, a 18 gauge needle was advanced into a caudate lobe collection using a left transhepatic approach with return of foul-smelling bloody fluid with significant debris   A sample was sent for culture  A guidewire was advanced  The tract was dilated and a 10 Western Jazmyn all-purpose drainage catheter was placed  The cavity was manually aspirated and placed to external bulb suction drainage  The catheter was secured with suture and a dressing was applied   This examination, like all CT scans performed in the [facility], was performed utilizing techniques to minimize radiation dose exposure, including the use of iterative reconstruction and automated exposure control  Contrast: 0 cc Omnipaque 300  Comparison: CT from 2/25/2019  Findings: Visualization was very challenging due to artifact  Transhepatic approach was used through the left lobe as this was felt to be the safer option  Right lobe access without traversing fissure was felt to be high risk due to inability to visualize vascular structures  Based on prior CT, there is only a small window through the right lobe seen between central branches of the portal vein as well as the IVC which was lateral to the collection  There is a sizable right pleural effusion which was not present previously Findings were discussed with the ICU team     Impression: Impression: Successful image-guided percutaneous drainage of liver abscess with placement of 10-Argentine drain Workstation performed: [id number]     Ir Suprapubic Tube    Result Date: 3/14/2019  Narrative: Limited ultrasound pelvis INDICATION:   Patient presents for suprapubic catheter placement with image guidance based on high PVR presumed urinary retention  Patient has been urinating with documented output of close to 500 mL this morning  Not symptomatic  FINDINGS: Lower abdominal ultrasound demonstrates moderate ascites  No bladder distention is identified  Impression: Probable false PVR reading due to ascites  Procedure was canceled and discussed with urology PA  Workstation performed: [id number]     Ir Chest Tube    Result Date: 3/6/2019  Narrative: Ultrasound guided right-sided chest tube Clinical History: Occluded right empyema Fluoro time: None Number of Images: 6 Conscious sedation time: None Technique: The patient was brought to the interventional radiology suite and identified verbally and by wristband  The patient was placed supine and right side up on the angiography table    After review of the patient's prior imaging studies, the skin over the right lateral posterior chest was prepped and draped in usual sterile fashion  Lidocaine was administered to the skin and a small skin incision was made  Under direct ultrasound guidance, a 5-Nauruan Jamison needle was advanced into the loculated right pleural effusion  A heavy-duty wire was coiled within the collection, and after tract dilatation, an 8 5 Western Jazmyn all-purpose drainage catheter was inserted over the wire  The catheter was sutured in place, sterilely dressed, and connected to a Pleur-evac  50 mL of fluid was aspirated  Impression: Impression: Successful placement of an 8 5 Nauruan catheter into the right empyema under ultrasound control, and 50 mL of purulent fluid was aspirated  Plan: Tube to -20 cm H2O Pleur-evac suction  The patient should undergo a CT scan of the chest abdomen and pelvis in approximately one week to determine if both this tube in the liver drainage tube require repositioning if both collections have not fully resolved Workstation performed: [id number]         Medications    Reviewed    Total time spent 45 minutes, >50% spent counseling and/or coordination of care           Miguelina Hall MD right normal/left normal

## 2022-03-15 NOTE — H&P PST ADULT - NSANTHTOTALSCORECAL_ENT_A_CORE
GBS and Chlamydia both positive.  Discussed the importance of taking the medication tonight, that her partner needs to be treated, and they should not have any sexual contact until after the baby is born.  She thought her appointment was tomorrow.  She has an appointment with Snehal BROWN CNM on 3 /20.  Has positive fetal movement.    Rx for Zithromax 1 gm, po x1 with one refill, electronically sent in to [facility] at 1803 on 3/12/20.    Luz Marina White CNM     3

## 2022-03-15 NOTE — ASU PATIENT PROFILE, ADULT - FALL HARM RISK - UNIVERSAL INTERVENTIONS
Bed in lowest position, wheels locked, appropriate side rails in place/Call bell, personal items and telephone in reach/Instruct patient to call for assistance before getting out of bed or chair/Non-slip footwear when patient is out of bed/[place] to call system/Physically safe environment - no spills, clutter or unnecessary equipment/Purposeful Proactive Rounding/Room/bathroom lighting operational, light cord in reach

## 2022-03-16 ENCOUNTER — OUTPATIENT (OUTPATIENT)
Dept: OUTPATIENT SERVICES | Facility: HOSPITAL | Age: 67
LOS: 1 days | Discharge: HOME | End: 2022-03-16
Payer: MEDICARE

## 2022-03-16 VITALS
OXYGEN SATURATION: 100 % | DIASTOLIC BLOOD PRESSURE: 73 MMHG | SYSTOLIC BLOOD PRESSURE: 120 MMHG | RESPIRATION RATE: 15 BRPM | HEART RATE: 64 BPM

## 2022-03-16 VITALS
SYSTOLIC BLOOD PRESSURE: 172 MMHG | HEART RATE: 74 BPM | RESPIRATION RATE: 12 BRPM | OXYGEN SATURATION: 99 % | TEMPERATURE: 97 F | DIASTOLIC BLOOD PRESSURE: 74 MMHG

## 2022-03-16 DIAGNOSIS — Z98.890 OTHER SPECIFIED POSTPROCEDURAL STATES: Chronic | ICD-10-CM

## 2022-03-16 DIAGNOSIS — Z12.9 ENCOUNTER FOR SCREENING FOR MALIGNANT NEOPLASM, SITE UNSPECIFIED: ICD-10-CM

## 2022-03-16 PROCEDURE — 88305 TISSUE EXAM BY PATHOLOGIST: CPT | Mod: 26

## 2022-03-24 LAB — SURGICAL PATHOLOGY STUDY: SIGNIFICANT CHANGE UP

## 2023-02-10 NOTE — ED ADULT NURSE REASSESSMENT NOTE - GASTROINTESTINAL WDL
Harsha blood work ordered by PCP. Pt tolerated procedure well. Pt brought past lab results as he was told to bring them. Copies were made and dropped off with PCP's clinical staff.   
Abdomen soft, nontender, nondistended, bowel sounds present in all 4 quadrants.

## 2023-08-10 NOTE — H&P PST ADULT - HEMATOLOGY/LYMPHATICS
details… Complex Repair Preamble Text (Leave Blank If You Do Not Want): Extensive wide undermining was performed.
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
## 2024-08-20 NOTE — ED CDU PROVIDER DISPOSITION NOTE - DISCHARGE DATE
Physical Therapy Visit    Visit Type: Daily Treatment Note  Visit: 15  Referring Provider: Provider Not In System  Medical Diagnosis (from order): I63.9 - Infarction of left basal ganglia  (CMD)  I10 - Primary hypertension  E78.49 - Other hyperlipidemia  F19.10 - Polysubstance abuse  (CMD)  R47.1 - Dysarthria     SUBJECTIVE                                                                                                               Patient reports discussing MRI results with MD    Pain / Symptoms  - Pain rating (out of 10): Current: 0       OBJECTIVE                                                                                                                           Standing Balance  (MIRELLA = base of support)  Firm Surface: Single Leg     - Left, Eyes Open details: with loss of balance, contact guard and without UE support     - Right, Eyes Open details: contact guard, with loss of balance and without UE support              Treatment     Therapeutic Exercise  Supine:  Bridge 2/5  Low trunk rotation on purple swiss ball 2/10  Posterior Pelvic tilts 2/10  Bridge with ball squeeze 2/10  Minimal verbal cuing for technique  **Increased time to complete planned session due to minimal verbal cuing required      Neuromuscular Re-Education  Treadmill walking 1.5-1.8 speed x 8 minutes, Heart rate 102-115 beats per minute  Minimal verbal cuing for eyes forward and increased step length    Standing unsupported:  Alternating lateral taps to cones on ground x 20 each LE        Skilled input: verbal instruction/cues, posture correction and as detailed above    Writer verbally educated and received verbal consent for hand placement, positioning of patient, and techniques to be performed today from patient for therapist position for techniques as described above and how they are pertinent to the patient's plan of care.  Home Exercise Program  Access Code: [email or web address]  URL: [email or web address]  Date:  08/20/2024  Prepared by: SEGUNDO RODRIGUEZ    Exercises  - Sit to Stand with Arms Crossed  - 1 x daily - 7 x weekly - 3 sets - 10 reps  - Standing March with Counter Support  - 1 x daily - 7 x weekly - 3 sets - 10 reps  - Heel Raises with Counter Support  - 1 x daily - 7 x weekly - 3 sets - 10 reps  - Standing Hip Abduction with Counter Support  - 1 x daily - 7 x weekly - 3 sets - 10 reps  - Walking  - 1 x daily - 7 x weekly - 3 sets - 10 reps  - Bent Knee Fallouts  - 1 x daily - 7 x weekly - 3 sets - 10 reps  - Hooklying Clamshell with Resistance  - 1 x daily - 7 x weekly - 3 sets - 10 reps  - Supine Bridge  - 1 x daily - 7 x weekly - 3 sets - 10 reps - 3-5 seconds hold  - Supine March with Resistance Band  - 1 x daily - 7 x weekly - 3 sets - 10 reps  - Standing Hip Extension with Counter Support  - 1 x daily - 7 x weekly - 2 sets - 10 reps  - Side Lunge with Counter Support  - 1 x daily - 7 x weekly - 3 sets - 10 reps  - Side Stepping with Counter Support  - 1 x daily - 7 x weekly - 3 sets - 10 reps  - Quadruped Alternating Arm Lift  - 1 x daily - 7 x weekly - 3 sets - 10 reps  - Quadruped Alternating Leg Extensions  - 1 x daily - 7 x weekly - 3 sets - 10 reps  - Child's Pose Stretch  - 1 x daily - 7 x weekly - 3 sets - 10 reps  - Supine Hamstring Curl on Swiss Ball  - 1 x daily - 7 x weekly - 3 sets - 10 reps  - Supine Lower Trunk Rotation  - 1 x daily - 7 x weekly - 3 sets - 10 reps  - Modified Hema Stretch  - 1 x daily - 7 x weekly - 2 sets - 30 hold  - Supine Butterfly Groin Stretch  - 1 x daily - 7 x weekly - 2 sets - 30 hold  - Supine Hip Abduction AROM  - 1 x daily - 7 x weekly - 2 sets - 30 hold  - Single Leg Bridge  - 1 x daily - 7 x weekly - 3 sets - 10 reps  - Supine Bridge with Mini Swiss Ball Between Knees  - 1 x daily - 7 x weekly - 3 sets - 10 reps  - Supine Posterior Pelvic Tilt  - 1 x daily - 7 x weekly - 3 sets - 10 reps  - Supine March with Posterior Pelvic Tilt  - 1 x daily - 7 x weekly - 3 sets -  10 reps      ASSESSMENT                                                                                                            Patient demonstrated improved posture during treadmill walking with minimal verbal cuing provided. Reinforced importance and benefit of completing HEP daily for maximal outcomes of OPPT.  Pain/symptoms after session (out of 10): 0  Education:   - Results of above outlined education: Verbalizes understanding, Demonstrates understanding and Needs reinforcement    PLAN                                                                                                                           Suggestions for next session as indicated: Progress per plan of care       Therapy procedure time and total treatment time can be found documented on the Time Entry flowsheet     13-Nov-2019

## 2025-04-28 ENCOUNTER — INPATIENT (INPATIENT)
Facility: HOSPITAL | Age: 70
LOS: 1 days | Discharge: HOME HEALTH | DRG: 322 | End: 2025-04-30
Attending: INTERNAL MEDICINE | Admitting: INTERNAL MEDICINE
Payer: MEDICARE

## 2025-04-28 ENCOUNTER — RESULT REVIEW (OUTPATIENT)
Age: 70
End: 2025-04-28

## 2025-04-28 VITALS
SYSTOLIC BLOOD PRESSURE: 130 MMHG | TEMPERATURE: 97 F | RESPIRATION RATE: 17 BRPM | OXYGEN SATURATION: 100 % | HEART RATE: 100 BPM | DIASTOLIC BLOOD PRESSURE: 80 MMHG | WEIGHT: 182.98 LBS

## 2025-04-28 DIAGNOSIS — I24.9 ACUTE ISCHEMIC HEART DISEASE, UNSPECIFIED: ICD-10-CM

## 2025-04-28 DIAGNOSIS — I21.4 NON-ST ELEVATION (NSTEMI) MYOCARDIAL INFARCTION: ICD-10-CM

## 2025-04-28 DIAGNOSIS — Z98.890 OTHER SPECIFIED POSTPROCEDURAL STATES: Chronic | ICD-10-CM

## 2025-04-28 PROBLEM — H40.059 OCULAR HYPERTENSION, UNSPECIFIED EYE: Chronic | Status: ACTIVE | Noted: 2022-03-15

## 2025-04-28 LAB
ALBUMIN SERPL ELPH-MCNC: 4.4 G/DL — SIGNIFICANT CHANGE UP (ref 3.3–5)
ALP SERPL-CCNC: 71 U/L — SIGNIFICANT CHANGE UP (ref 40–120)
ALT FLD-CCNC: 46 U/L — HIGH (ref 10–45)
ANION GAP SERPL CALC-SCNC: 17 MMOL/L — SIGNIFICANT CHANGE UP (ref 5–17)
APTT BLD: 27.8 SEC — SIGNIFICANT CHANGE UP (ref 26.1–36.8)
APTT BLD: 52.3 SEC — HIGH (ref 26.1–36.8)
AST SERPL-CCNC: 125 U/L — HIGH (ref 10–40)
BASOPHILS # BLD AUTO: 0.06 K/UL — SIGNIFICANT CHANGE UP (ref 0–0.2)
BASOPHILS NFR BLD AUTO: 0.5 % — SIGNIFICANT CHANGE UP (ref 0–2)
BILIRUB SERPL-MCNC: 0.9 MG/DL — SIGNIFICANT CHANGE UP (ref 0.2–1.2)
BUN SERPL-MCNC: 14 MG/DL — SIGNIFICANT CHANGE UP (ref 7–23)
CALCIUM SERPL-MCNC: 9.7 MG/DL — SIGNIFICANT CHANGE UP (ref 8.4–10.5)
CHLORIDE SERPL-SCNC: 108 MMOL/L — SIGNIFICANT CHANGE UP (ref 96–108)
CO2 SERPL-SCNC: 22 MMOL/L — SIGNIFICANT CHANGE UP (ref 22–31)
CREAT SERPL-MCNC: 1.01 MG/DL — SIGNIFICANT CHANGE UP (ref 0.5–1.3)
EGFR: 80 ML/MIN/1.73M2 — SIGNIFICANT CHANGE UP
EGFR: 80 ML/MIN/1.73M2 — SIGNIFICANT CHANGE UP
EOSINOPHIL # BLD AUTO: 0.02 K/UL — SIGNIFICANT CHANGE UP (ref 0–0.5)
EOSINOPHIL NFR BLD AUTO: 0.2 % — SIGNIFICANT CHANGE UP (ref 0–6)
GLUCOSE SERPL-MCNC: 113 MG/DL — HIGH (ref 70–99)
HCT VFR BLD CALC: 44.3 % — SIGNIFICANT CHANGE UP (ref 39–50)
HCT VFR BLD CALC: 44.3 % — SIGNIFICANT CHANGE UP (ref 39–50)
HGB BLD-MCNC: 14.9 G/DL — SIGNIFICANT CHANGE UP (ref 13–17)
HGB BLD-MCNC: 14.9 G/DL — SIGNIFICANT CHANGE UP (ref 13–17)
IMM GRANULOCYTES NFR BLD AUTO: 0.3 % — SIGNIFICANT CHANGE UP (ref 0–0.9)
INR BLD: 1.12 RATIO — SIGNIFICANT CHANGE UP (ref 0.85–1.16)
LIDOCAIN IGE QN: 37 U/L — SIGNIFICANT CHANGE UP (ref 7–60)
LYMPHOCYTES # BLD AUTO: 16.3 % — SIGNIFICANT CHANGE UP (ref 13–44)
LYMPHOCYTES # BLD AUTO: 2.05 K/UL — SIGNIFICANT CHANGE UP (ref 1–3.3)
MCHC RBC-ENTMCNC: 27.4 PG — SIGNIFICANT CHANGE UP (ref 27–34)
MCHC RBC-ENTMCNC: 28.1 PG — SIGNIFICANT CHANGE UP (ref 27–34)
MCHC RBC-ENTMCNC: 33.6 G/DL — SIGNIFICANT CHANGE UP (ref 32–36)
MCHC RBC-ENTMCNC: 33.6 G/DL — SIGNIFICANT CHANGE UP (ref 32–36)
MCV RBC AUTO: 81.4 FL — SIGNIFICANT CHANGE UP (ref 80–100)
MCV RBC AUTO: 83.4 FL — SIGNIFICANT CHANGE UP (ref 80–100)
MONOCYTES # BLD AUTO: 1.42 K/UL — HIGH (ref 0–0.9)
MONOCYTES NFR BLD AUTO: 11.3 % — SIGNIFICANT CHANGE UP (ref 2–14)
NEUTROPHILS # BLD AUTO: 8.99 K/UL — HIGH (ref 1.8–7.4)
NEUTROPHILS NFR BLD AUTO: 71.4 % — SIGNIFICANT CHANGE UP (ref 43–77)
NRBC BLD AUTO-RTO: 0 /100 WBCS — SIGNIFICANT CHANGE UP (ref 0–0)
NRBC BLD AUTO-RTO: 0 /100 WBCS — SIGNIFICANT CHANGE UP (ref 0–0)
NT-PROBNP SERPL-SCNC: 1675 PG/ML — HIGH (ref 0–300)
PLATELET # BLD AUTO: 207 K/UL — SIGNIFICANT CHANGE UP (ref 150–400)
PLATELET # BLD AUTO: 214 K/UL — SIGNIFICANT CHANGE UP (ref 150–400)
POTASSIUM SERPL-MCNC: 4.1 MMOL/L — SIGNIFICANT CHANGE UP (ref 3.5–5.3)
POTASSIUM SERPL-SCNC: 4.1 MMOL/L — SIGNIFICANT CHANGE UP (ref 3.5–5.3)
PROT SERPL-MCNC: 7.6 G/DL — SIGNIFICANT CHANGE UP (ref 6–8.3)
PROTHROM AB SERPL-ACNC: 12.7 SEC — SIGNIFICANT CHANGE UP (ref 9.9–13.4)
RBC # BLD: 5.31 M/UL — SIGNIFICANT CHANGE UP (ref 4.2–5.8)
RBC # BLD: 5.44 M/UL — SIGNIFICANT CHANGE UP (ref 4.2–5.8)
RBC # FLD: 14.5 % — SIGNIFICANT CHANGE UP (ref 10.3–14.5)
RBC # FLD: 14.5 % — SIGNIFICANT CHANGE UP (ref 10.3–14.5)
SODIUM SERPL-SCNC: 147 MMOL/L — HIGH (ref 135–145)
TROPONIN T, HIGH SENSITIVITY RESULT: 1435 NG/L — HIGH (ref 0–51)
TROPONIN T, HIGH SENSITIVITY RESULT: 1466 NG/L — HIGH (ref 0–51)
WBC # BLD: 12.58 K/UL — HIGH (ref 3.8–10.5)
WBC # BLD: 13.53 K/UL — HIGH (ref 3.8–10.5)
WBC # FLD AUTO: 12.58 K/UL — HIGH (ref 3.8–10.5)
WBC # FLD AUTO: 13.53 K/UL — HIGH (ref 3.8–10.5)

## 2025-04-28 PROCEDURE — 99291 CRITICAL CARE FIRST HOUR: CPT | Mod: GC

## 2025-04-28 PROCEDURE — 71045 X-RAY EXAM CHEST 1 VIEW: CPT | Mod: 26

## 2025-04-28 PROCEDURE — 93308 TTE F-UP OR LMTD: CPT | Mod: 26

## 2025-04-28 PROCEDURE — 99223 1ST HOSP IP/OBS HIGH 75: CPT

## 2025-04-28 PROCEDURE — 93306 TTE W/DOPPLER COMPLETE: CPT | Mod: 26

## 2025-04-28 PROCEDURE — 99223 1ST HOSP IP/OBS HIGH 75: CPT | Mod: GC

## 2025-04-28 PROCEDURE — 93010 ELECTROCARDIOGRAM REPORT: CPT

## 2025-04-28 RX ORDER — HEPARIN SODIUM 1000 [USP'U]/ML
INJECTION INTRAVENOUS; SUBCUTANEOUS
Qty: 25000 | Refills: 0 | Status: DISCONTINUED | OUTPATIENT
Start: 2025-04-28 | End: 2025-04-28

## 2025-04-28 RX ORDER — TICAGRELOR 90 MG/1
90 TABLET ORAL
Refills: 0 | Status: DISCONTINUED | OUTPATIENT
Start: 2025-04-28 | End: 2025-04-29

## 2025-04-28 RX ORDER — HEPARIN SODIUM 1000 [USP'U]/ML
INJECTION INTRAVENOUS; SUBCUTANEOUS
Qty: 25000 | Refills: 0 | Status: DISCONTINUED | OUTPATIENT
Start: 2025-04-28 | End: 2025-04-29

## 2025-04-28 RX ORDER — ATORVASTATIN CALCIUM 80 MG/1
80 TABLET, FILM COATED ORAL AT BEDTIME
Refills: 0 | Status: DISCONTINUED | OUTPATIENT
Start: 2025-04-28 | End: 2025-04-30

## 2025-04-28 RX ORDER — LATANOPROST PF 0.05 MG/ML
1 SOLUTION/ DROPS OPHTHALMIC
Refills: 0 | DISCHARGE

## 2025-04-28 RX ORDER — ASPIRIN 325 MG
81 TABLET ORAL DAILY
Refills: 0 | Status: DISCONTINUED | OUTPATIENT
Start: 2025-04-28 | End: 2025-04-30

## 2025-04-28 RX ORDER — HEPARIN SODIUM 1000 [USP'U]/ML
4000 INJECTION INTRAVENOUS; SUBCUTANEOUS ONCE
Refills: 0 | Status: COMPLETED | OUTPATIENT
Start: 2025-04-28 | End: 2025-04-28

## 2025-04-28 RX ORDER — HEPARIN SODIUM 1000 [USP'U]/ML
4000 INJECTION INTRAVENOUS; SUBCUTANEOUS EVERY 6 HOURS
Refills: 0 | Status: DISCONTINUED | OUTPATIENT
Start: 2025-04-28 | End: 2025-04-30

## 2025-04-28 RX ORDER — LATANOPROST PF 0.05 MG/ML
0 SOLUTION/ DROPS OPHTHALMIC
Refills: 0 | DISCHARGE

## 2025-04-28 RX ORDER — TICAGRELOR 90 MG/1
180 TABLET ORAL ONCE
Refills: 0 | Status: COMPLETED | OUTPATIENT
Start: 2025-04-28 | End: 2025-04-28

## 2025-04-28 RX ORDER — METOPROLOL SUCCINATE 50 MG/1
12.5 TABLET, EXTENDED RELEASE ORAL
Refills: 0 | Status: DISCONTINUED | OUTPATIENT
Start: 2025-04-28 | End: 2025-04-30

## 2025-04-28 RX ADMIN — HEPARIN SODIUM 4000 UNIT(S): 1000 INJECTION INTRAVENOUS; SUBCUTANEOUS at 13:18

## 2025-04-28 RX ADMIN — HEPARIN SODIUM 1000 UNIT(S)/HR: 1000 INJECTION INTRAVENOUS; SUBCUTANEOUS at 13:19

## 2025-04-28 RX ADMIN — ATORVASTATIN CALCIUM 80 MILLIGRAM(S): 80 TABLET, FILM COATED ORAL at 22:31

## 2025-04-28 RX ADMIN — HEPARIN SODIUM 1000 UNIT(S)/HR: 1000 INJECTION INTRAVENOUS; SUBCUTANEOUS at 21:03

## 2025-04-28 RX ADMIN — METOPROLOL SUCCINATE 12.5 MILLIGRAM(S): 50 TABLET, EXTENDED RELEASE ORAL at 17:57

## 2025-04-28 RX ADMIN — TICAGRELOR 90 MILLIGRAM(S): 90 TABLET ORAL at 17:57

## 2025-04-28 RX ADMIN — TICAGRELOR 180 MILLIGRAM(S): 90 TABLET ORAL at 13:05

## 2025-04-28 NOTE — ED ADULT TRIAGE NOTE - NS ED NURSE AMBULANCES
[facility] Picato Counseling:  I discussed with the patient the risks of Picato including but not limited to erythema, scaling, itching, weeping, crusting, and pain.

## 2025-04-28 NOTE — H&P ADULT - HISTORY OF PRESENT ILLNESS
69M w/ no significant PMHx, remote smoking when pt was in his 20s p/w intermittent nonexertional, nonpleuritic, nonradiating substernal CP that began last Wednesday and was partially relieved with TUMS/pepcid. Collateral info provided by wife at bedside. Pt was sitting on a chair at onset of symptoms and has had daily symptoms, mostly in the evening with last episode occurring 2 days ago. He reports similar CP ~7 yrs ago, was evaluated by Dr. Masterson with unremarkable Sestamibi stress test and started on TUMS/pepcid for atypical CP attributed to GERD. He visited his PCP today and was sent to the ED for abnormal EKG. En route to ED,  load was given by EMS. Reports upper respiratory infection in March 2025, treated in outpatient setting with Z-saba and antibiotics (unable to recall the name). At baseline, pt reports active lifestyle: spends 25mins on treadmill with 25-lb weight lifting daily. Denies HA, dizz, f/c, cough, SOB, palp, orthopnea, diaphoresis, PND, Abd pain, n/v, recent travel.  Cardiology following. Pt admitted for concern for STEMI. Planned for LHC on 4/29.   69M w/ no significant PMHx, remote smoking when pt was in his 20s p/w intermittent nonexertional, nonpleuritic, nonradiating substernal CP that began last Wednesday and was partially relieved with TUMS/pepcid. Pt was sitting on a chair at onset of symptoms and has had daily symptoms, mostly in the evening with last episode occurring 2 days ago. He reports similar CP about 7 yrs ago, found to have unremarkable Sestamibi stress test at that time. He was started on TUMS/pepcid for atypical CP attributed to GERD. Pt visited his PCP today and was sent to the ED for abnormal EKG. En route to ED,  load was given by EMS.    At baseline, pt reports active lifestyle: spends 25mins on treadmill with 25-lb weight lifting daily. He denies headache, dizziness, fever/chills, cough, SOB, palp, orthopnea, diaphoresis, PND, Abd pain, n/v.  Seen by Cardiology for  concern for NSTEMI. Planned for LHC on 4/29.  Currently pt denies any acute c/o.    ED COURSE  VS : /80        RR  17  O2 100% RA     T97.2F  Labs: hs trop 1435--> 1466   bnp 1675  Cr 1.01 Wbc 12  Treatment :  Brilinta 180mg po x 1, heparin gtt

## 2025-04-28 NOTE — PATIENT PROFILE ADULT - FALL HARM RISK - HARM RISK INTERVENTIONS

## 2025-04-28 NOTE — CONSULT NOTE ADULT - ATTENDING COMMENTS
69 year old man with late presentation inferior wall MI, last chest pain greater than 48 hours ago, EKG with Q waves, troponin 1400. Echo confirms inferior wall MI. Started on ACS protocol and plan coronary angiography tomorrow.    To contact call Cardiology Fellow or Attending as listed on [email or web address] password: [email or web address].

## 2025-04-28 NOTE — H&P ADULT - RESPIRATORY AND THORAX
details… Attending MD [place]: 45F with EtOH abuse, here in Oct for same, took Xanax with vodka today, no capacity, given Haldol bc uncooperative, EKG, Tox panel, Labs, Tyl/Sal, refused psych, now sleeping, +nystagmus Attending MD [place]: 45F with EtOH abuse, here in Oct for same, took Xanax with vodka today, no capacity, given Haldol bc uncooperative, EKG, Tox panel, Labs, Tyl/Sal, refused psych, now sleeping, +nystagmus    12/10/20 00:00 Yi to [place], awaiting sobriety for psych

## 2025-04-28 NOTE — CONSULT NOTE ADULT - SUBJECTIVE AND OBJECTIVE BOX
Cardiology Consult Note   [Please check [email or web address] password: [email or web address]" for cardiology service schedule and contact information]    HPI: 69M w/ no significant PMHx, remote smoking when pt was in his 20s p/w intermittent nonpleuritic, nonradiating midsternal CP that began last Wednesday that was partially relieved with TUMS/pepcid. Pt reports similar CP approx. 7 yrs ago for which he was evaluated by Dr. Masterson with unremarkable Sestamibi stress test with atypical CP attributed to GERD after which pt was started on TUMS/pepcid. Denies       PAST MEDICAL & SURGICAL HISTORY:  Colon polyp      Ocular hypertension      H/O colonoscopy  2/2022        FAMILY HISTORY:  Family history of acute myocardial infarction (Father)      SOCIAL HISTORY:  unchanged    MEDICATIONS:  heparin   Injectable 4000 Unit(s) IV Push every 6 hours PRN  heparin  Infusion.  Unit(s)/Hr IV Continuous <Continuous>                    -------------------------------------------------------------------------------------------  PHYSICAL EXAM:  T(C): 36.2 (04-28-25 @ 10:22), Max: 36.2 (04-28-25 @ 10:22)  HR: 77 (04-28-25 @ 10:44) (77 - 100)  BP: 124/70 (04-28-25 @ 10:44) (124/70 - 130/80)  RR: 18 (04-28-25 @ 10:44) (17 - 18)  SpO2: 98% (04-28-25 @ 10:44) (98% - 100%)  Wt(kg): --  I&O's Summary      GENERAL: NAD  HEAD: Atraumatic, Normocephalic.  ENT: Moist mucous membranes.  NECK: Supple, No JVD.  CHEST/LUNG: Clear to auscultation bilaterally; No rales, rhonchi, wheezing, or rubs. Unlabored respirations.  HEART: Regular rate and rhythm; No murmurs, rubs, or gallops.  ABDOMEN: Bowel sounds present; Soft, Nontender, Nondistended.   EXTREMITIES:  2+ Peripheral Pulses, brisk capillary refill. No clubbing, cyanosis, or edema.    -------------------------------------------------------------------------------------------  LABS:                          14.9   12.58 )-----------( 207      ( 28 Apr 2025 10:59 )             44.3     04-28    147[H]  |  108  |  14  ----------------------------<  113[H]  4.1   |  22  |  1.01    Ca    9.7      28 Apr 2025 10:59    TPro  7.6  /  Alb  4.4  /  TBili  0.9  /  DBili  x   /  AST  125[H]  /  ALT  46[H]  /  AlkPhos  71  04-28    PT/INR - ( 28 Apr 2025 10:59 )   PT: 12.7 sec;   INR: 1.12 ratio         PTT - ( 28 Apr 2025 10:59 )  PTT:27.8 sec  CARDIAC MARKERS ( 28 Apr 2025 10:59 )  1435 ng/L / x     / x     / x     / x     / x                  -------------------------------------------------------------------------------------------  Cardiovascular Diagnostic Testing:    ECG: SR @80bpm w/ occassional PACs. TWI and Q waves in inferior leads    Echo:   POCUS ED: No pericardial effusion, no WMA. IVC flat w/ Respiratory variation. Preserved EF    Stress Testing:     Cath: n/a    -------------------------------------------------------------------------------------------                 Cardiology Consult Note   [Please check [email or web address] password: [email or web address]" for cardiology service schedule and contact information]    HPI: 69M w/ no significant PMHx, remote smoking when pt was in his 20s p/w intermittent nonexertional, nonpleuritic, nonradiating midsternal CP that began last Wednesday that was partially relieved with TUMS/pepcid. Collateral info provided by wife at bedside. Pt was sitting on a chair at onset of symptoms and has had daily symptoms, mostly in the evening with last episode occuring 2 days ago. He reports similar CP ~7 yrs ago, was evaluated by Dr. Masterson with unremarkable Sestamibi stress test and started on TUMS/pepcid for atypical CP attributed to GERD. He visited his PCP today and was sent to the ED via EMS for abnormal EKG. En route to ED,  load was given by EMS. Reports upper respiratory infection in 2025, treated in outpatient setting with Z-saba and antibiotics (unable to recall the name). Denies HA, dizz, f/c, cough, SOB, palp, orthopnea, diaphoresis, PND, Abd pain, n/v, recent travel.    Cardiology consulted for abnormal EKG, possible STEMI.      PAST MEDICAL & SURGICAL HISTORY:  Colon polyp      Ocular hypertension      H/O colonoscopy  2022        FAMILY HISTORY:  Family history of acute myocardial infarction (Father) at age 53.   Acute MI in (Mother) age 75.   CABG (sister) at age 75. Living  Stent placement (brother) at age late 40s, early 50s. Living    SOCIAL HISTORY:  unchanged    MEDICATIONS:  heparin   Injectable 4000 Unit(s) IV Push every 6 hours PRN  heparin  Infusion.  Unit(s)/Hr IV Continuous <Continuous>      -------------------------------------------------------------------------------------------  ROS              -------------------------------------------------------------------------------------------  PHYSICAL EXAM:  T(C): 36.2 (25 @ 10:22), Max: 36.2 (25 @ 10:22)  HR: 77 (25 @ 10:44) (77 - 100)  BP: 124/70 (25 @ 10:44) (124/70 - 130/80)  RR: 18 (25 @ 10:44) (17 - 18)  SpO2: 98% (25 @ 10:44) (98% - 100%)  Wt(kg): --  I&O's Summary      GENERAL: NAD  HEAD: Atraumatic, Normocephalic.  ENT: Moist mucous membranes.  NECK: Supple, No JVD.  CHEST/LUNG: Clear to auscultation bilaterally; No rales, rhonchi, wheezing, or rubs. Unlabored respirations.  HEART: Regular rate and rhythm; No murmurs, rubs, or gallops.  ABDOMEN: Bowel sounds present; Soft, Nontender, Nondistended.   EXTREMITIES:  2+ Peripheral Pulses, brisk capillary refill. No clubbing, cyanosis, or edema.    -------------------------------------------------------------------------------------------  LABS:                          14.9   12.58 )-----------( 207      ( 2025 10:59 )             44.3     -    147[H]  |  108  |  14  ----------------------------<  113[H]  4.1   |  22  |  1.01    Ca    9.7      2025 10:59    TPro  7.6  /  Alb  4.4  /  TBili  0.9  /  DBili  x   /  AST  125[H]  /  ALT  46[H]  /  AlkPhos  71  04-28    PT/INR - ( 2025 10:59 )   PT: 12.7 sec;   INR: 1.12 ratio         PTT - ( 2025 10:59 )  PTT:27.8 sec  CARDIAC MARKERS ( 2025 10:59 )  1435 ng/L / x     / x     / x     / x     / x                  -------------------------------------------------------------------------------------------  Cardiovascular Diagnostic Testing:    ECG: SR @80bpm w/ occassional PACs. TWI and Q waves in inferior leads    Echo:   POCUS ED: No pericardial effusion, no WMA. IVC flat w/ Respiratory variation. Preserved EF    Stress Testing:     Cath: n/a    -------------------------------------------------------------------------------------------                 Cardiology Consult Note   [Please check [email or web address] password: "[email or web address]" for cardiology service schedule and contact information]    HPI: 69M w/ no significant PMHx, remote smoking when pt was in his 20s p/w intermittent nonexertional, nonpleuritic, nonradiating substernal CP that began last Wednesday and was partially relieved with TUMS/pepcid. Collateral info provided by wife at bedside. Pt was sitting on a chair at onset of symptoms and has had daily symptoms, mostly in the evening with last episode occurring 2 days ago. He reports similar CP ~7 yrs ago, was evaluated by Dr. Masterson with unremarkable Sestamibi stress test and started on TUMS/pepcid for atypical CP attributed to GERD. He visited his PCP today and was sent to the ED for abnormal EKG. En route to ED,  load was given by EMS. Reports upper respiratory infection in 2025, treated in outpatient setting with Z-saba and antibiotics (unable to recall the name). At baseline, pt reports active lifestyle: spends 25mins on treadmill with 25-lb weight lifting daily. Denies HA, dizz, f/c, cough, SOB, palp, orthopnea, diaphoresis, PND, Abd pain, n/v, recent travel.    Cardiology consulted for abnormal EKG, possible STEMI.      PAST MEDICAL & SURGICAL HISTORY:  Colon polyp      Ocular hypertension      H/O colonoscopy  2022        FAMILY HISTORY:  Family history of acute myocardial infarction (Father) at age 53.   Acute MI in (Mother) age 75.   CABG (sister) at age 75. Living  Stent placement (brother) at age late 40s, early 50s. Living    SOCIAL HISTORY:  unchanged    MEDICATIONS:  heparin   Injectable 4000 Unit(s) IV Push every 6 hours PRN  heparin  Infusion.  Unit(s)/Hr IV Continuous <Continuous>      -------------------------------------------------------------------------------------------  ROS  Constitutional: fever absent, malaise absent  HEENT: eye redness absent, congestion absent  Lung: SOB absent, cough absent  Heart: + substernal chest pain, palpitations absent  Abdomen: abdominal pain absent, nausea absent, vomiting absent, hematuria absent, hematochezia absent  Extremities: peripheral edema absent, injury absent  Neurological: dizziness absent, headache absent            -------------------------------------------------------------------------------------------  PHYSICAL EXAM:  T(C): 36.2 (25 @ 10:22), Max: 36.2 (25 @ 10:22)  HR: 77 (25 @ 10:44) (77 - 100)  BP: 124/70 (25 @ 10:44) (124/70 - 130/80)  RR: 18 (25 @ 10:44) (17 - 18)  SpO2: 98% (25 @ 10:44) (98% - 100%)  Wt(kg): --  I&O's Summary      GENERAL: NAD  HEAD: Atraumatic, Normocephalic.  ENT: Moist mucous membranes.  NECK: Supple, No JVD.  CHEST/LUNG: Clear to auscultation bilaterally; No rales, rhonchi, wheezing, or rubs. Unlabored respirations.  HEART: Regular rate and rhythm; No murmurs, rubs, or gallops.  ABDOMEN: Bowel sounds present; Soft, Nontender, Nondistended.   EXTREMITIES:  2+ Peripheral Pulses, brisk capillary refill. No clubbing, cyanosis, or edema.    -------------------------------------------------------------------------------------------  LABS:                          14.9   12.58 )-----------( 207      ( 2025 10:59 )             44.3     04-    147[H]  |  108  |  14  ----------------------------<  113[H]  4.1   |  22  |  1.01    Ca    9.7      2025 10:59    TPro  7.6  /  Alb  4.4  /  TBili  0.9  /  DBili  x   /  AST  125[H]  /  ALT  46[H]  /  AlkPhos  71  04-28    PT/INR - ( 2025 10:59 )   PT: 12.7 sec;   INR: 1.12 ratio         PTT - ( 2025 10:59 )  PTT:27.8 sec  CARDIAC MARKERS ( 2025 10:59 )  1435 ng/L / x     / x     / x     / x     / x                  -------------------------------------------------------------------------------------------  Cardiovascular Diagnostic Testing:    ECG: SR @80bpm w/ occassional PACs. TWI and Q waves in inferior leads    Echo:   POCUS ED: No pericardial effusion, no WMA. IVC flat w/ Respiratory variation. Preserved EF    Stress Testing:     Cath: n/a    -------------------------------------------------------------------------------------------                 Cardiology Consult Note   [Please check [email or web address] password: "[email or web address]" for cardiology service schedule and contact information]    HPI: 69M w/ no significant PMHx, remote smoking when pt was in his 20s p/w intermittent nonexertional, nonpleuritic, nonradiating substernal CP that began last Wednesday and was partially relieved with TUMS/pepcid. Collateral info provided by wife at bedside. Pt was sitting on a chair at onset of symptoms and has had daily symptoms, mostly in the evening with last episode occurring 2 days ago. He reports similar CP ~7 yrs ago, was evaluated by Dr. Masterson with unremarkable Sestamibi stress test and started on TUMS/pepcid for atypical CP attributed to GERD. He visited his PCP today and was sent to the ED for abnormal EKG. En route to ED,  load was given by EMS. Reports upper respiratory infection in 2025, treated in outpatient setting with Z-saba and antibiotics (unable to recall the name). At baseline, pt reports active lifestyle: spends 25mins on treadmill with 25-lb weight lifting daily. Denies HA, dizz, f/c, cough, SOB, palp, orthopnea, diaphoresis, PND, Abd pain, n/v, recent travel.    Cardiology consulted for abnormal EKG, possible STEMI.      PAST MEDICAL & SURGICAL HISTORY:  Colon polyp      Ocular hypertension      H/O colonoscopy  2022        FAMILY HISTORY:  Family history of acute myocardial infarction (Father) at age 53.   Acute MI in (Mother) age 75.   CABG (sister) at age 75. Living  Stent placement (brother) at age late 40s, early 50s. Living    SOCIAL HISTORY:  unchanged    MEDICATIONS:  heparin   Injectable 4000 Unit(s) IV Push every 6 hours PRN  heparin  Infusion.  Unit(s)/Hr IV Continuous <Continuous>      -------------------------------------------------------------------------------------------  ROS  Constitutional: fever absent, malaise absent  HEENT: eye redness absent, congestion absent  Lung: SOB absent, cough absent  Heart: + substernal chest pain, palpitations absent  Abdomen: abdominal pain absent, nausea absent, vomiting absent, hematuria absent, hematochezia absent  Extremities: peripheral edema absent, injury absent  Neurological: dizziness absent, headache absent            -------------------------------------------------------------------------------------------  PHYSICAL EXAM:  T(C): 36.2 (25 @ 10:22), Max: 36.2 (25 @ 10:22)  HR: 77 (25 @ 10:44) (77 - 100)  BP: 124/70 (25 @ 10:44) (124/70 - 130/80)  RR: 18 (25 @ 10:44) (17 - 18)  SpO2: 98% (25 @ 10:44) (98% - 100%)  Wt(kg): --  I&O's Summary      GENERAL: NAD  HEAD: Atraumatic, Normocephalic.  ENT: Moist mucous membranes.  NECK: Supple, No JVD.  CHEST/LUNG: Clear to auscultation bilaterally; No rales, rhonchi, wheezing, or rubs. Unlabored respirations.  HEART: Regular rate and rhythm; No murmurs, rubs, or gallops.  ABDOMEN: Bowel sounds present; Soft, Nontender, Nondistended.   EXTREMITIES:  2+ Peripheral Pulses, brisk capillary refill. No clubbing, cyanosis, or edema.    -------------------------------------------------------------------------------------------  LABS:                          14.9   12.58 )-----------( 207      ( 2025 10:59 )             44.3     04    147[H]  |  108  |  14  ----------------------------<  113[H]  4.1   |  22  |  1.01    Ca    9.7      2025 10:59    TPro  7.6  /  Alb  4.4  /  TBili  0.9  /  DBili  x   /  AST  125[H]  /  ALT  46[H]  /  AlkPhos  71  04-28    PT/INR - ( 2025 10:59 )   PT: 12.7 sec;   INR: 1.12 ratio         PTT - ( 2025 10:59 )  PTT:27.8 sec  CARDIAC MARKERS ( 2025 10:59 )  1435 ng/L / x     / x     / x     / x     / x                  -------------------------------------------------------------------------------------------  Cardiovascular Diagnostic Testing:    ECG: SR @80bpm w/ occassional PACs. TWI and Q waves in inferior leads    Echo:   POCUS ED: No pericardial effusion, no WMA. IVC flat w/ Respiratory variation. Preserved EF    Stress Testing:   Nuclear Stress test 2019  Baseline ECG: Nonspecific ST-T wave abnormality.  ECG Changes: ST Depression: 0.5 mm upsloping in leads II, III, aVF, and V4-V6 started at 10:00 min of exercise at HR of 150 and persisted 01:00 min into recovery.  These changes did not meet ischemic criteria.  Frequent VPDs during rest, stress and recovery.  Post stress: EF 65%. mild hypokinesis of inferior and basal septal walls.  LV size wnl. +medium-sized, moderate defects in the inferior, basal septal, and basal inferolateral walls that are mostly fixed suggestive of infarct with mild mervat-infarct ischemia.      Cath: n/a    -------------------------------------------------------------------------------------------                 Cardiology Consult Note   [Please check [email or web address] password: "[email or web address]" for cardiology service schedule and contact information]    HPI: 69M w/ no significant PMHx, remote smoking when pt was in his 20s p/w intermittent nonexertional, nonpleuritic, nonradiating substernal CP that began last Wednesday and was partially relieved with TUMS/pepcid. Collateral info provided by wife at bedside. Pt was sitting on a chair at onset of symptoms and has had daily symptoms, mostly in the evening with last episode occurring 2 days ago. He reports similar CP ~7 yrs ago, was evaluated by Dr. Masterson with unremarkable Sestamibi stress test and started on TUMS/pepcid for atypical CP attributed to GERD. He visited his PCP today and was sent to the ED for abnormal EKG. En route to ED,  load was given by EMS. Reports upper respiratory infection in 2025, treated in outpatient setting with Z-saba and antibiotics (unable to recall the name). At baseline, pt reports active lifestyle: spends 25mins on treadmill with 25-lb weight lifting daily. Denies HA, dizz, f/c, cough, SOB, palp, orthopnea, diaphoresis, PND, Abd pain, n/v, recent travel.    Cardiology consulted for abnormal EKG, possible STEMI.      PAST MEDICAL & SURGICAL HISTORY:  Colon polyp      Ocular hypertension      H/O colonoscopy  2022        FAMILY HISTORY:  Family history of acute myocardial infarction (Father) at age 53.   Acute MI in (Mother) age 75.   CABG (sister) at age 75. Living  Stent placement (brother) at age late 40s, early 50s. Living    SOCIAL HISTORY:  unchanged    MEDICATIONS:  heparin   Injectable 4000 Unit(s) IV Push every 6 hours PRN  heparin  Infusion.  Unit(s)/Hr IV Continuous <Continuous>      -------------------------------------------------------------------------------------------  ROS  Constitutional: fever absent, malaise absent  HEENT: eye redness absent, congestion absent  Lung: SOB absent, cough absent  Heart: + substernal chest pain, palpitations absent  Abdomen: abdominal pain absent, nausea absent, vomiting absent, hematuria absent, hematochezia absent  Extremities: peripheral edema absent, injury absent  Neurological: dizziness absent, headache absent            -------------------------------------------------------------------------------------------  PHYSICAL EXAM:  T(C): 36.2 (25 @ 10:22), Max: 36.2 (25 @ 10:22)  HR: 77 (25 @ 10:44) (77 - 100)  BP: 124/70 (25 @ 10:44) (124/70 - 130/80)  RR: 18 (25 @ 10:44) (17 - 18)  SpO2: 98% (25 @ 10:44) (98% - 100%)  Wt(kg): --  I&O's Summary      GENERAL: NAD  HEAD: Atraumatic, Normocephalic.  ENT: Moist mucous membranes.  NECK: Supple, No JVD.  CHEST/LUNG: Clear to auscultation bilaterally; No rales, rhonchi, wheezing, or rubs. Unlabored respirations.  HEART: Regular rate and rhythm; No murmurs, rubs, or gallops.  ABDOMEN: Bowel sounds present; Soft, Nontender, Nondistended.   EXTREMITIES:  2+ Peripheral Pulses, brisk capillary refill. No clubbing, cyanosis, or edema.    -------------------------------------------------------------------------------------------  LABS:                          14.9   12.58 )-----------( 207      ( 2025 10:59 )             44.3         147[H]  |  108  |  14  ----------------------------<  113[H]  4.1   |  22  |  1.01    Ca    9.7      2025 10:59    TPro  7.6  /  Alb  4.4  /  TBili  0.9  /  DBili  x   /  AST  125[H]  /  ALT  46[H]  /  AlkPhos  71      PT/INR - ( 2025 10:59 )   PT: 12.7 sec;   INR: 1.12 ratio         PTT - ( 2025 10:59 )  PTT:27.8 sec  CARDIAC MARKERS ( 2025 10:59 )  1435 ng/L / x     / x     / x     / x     / x                  -------------------------------------------------------------------------------------------  Cardiovascular Diagnostic Testing:    ECG: SR @80bpm w/ occassional PACs. TWI and Q waves in inferior leads    Echo:   POCUS ED: No pericardial effusion, no WMA. IVC flat w/ Respiratory variation. Preserved EF    < from: TTE W or WO Ultrasound Enhancing Agent (25 @ 14:46) >  1. Left ventricular cavity is normal in size. Left ventricular systolic function is mildly decreased with an ejection fraction visually estimated at 45 to 50 %. Regional wall motion abnormalities present.   2. Basal and mid inferior wall, mid inferolateral segment, basal and mid inferior septum, and basal inferolateral segment are abnormal.   3. Normal right ventricular cavity size and normal right ventricular systolic function.   4. No significant valvular disease.   5. No pericardial effusion seen.    < end of copied text >      Stress Testing:   Nuclear Stress test 2019  Baseline ECG: Nonspecific ST-T wave abnormality.  ECG Changes: ST Depression: 0.5 mm upsloping in leads II, III, aVF, and V4-V6 started at 10:00 min of exercise at HR of 150 and persisted 01:00 min into recovery.  These changes did not meet ischemic criteria.  Frequent VPDs during rest, stress and recovery.  Post stress: EF 65%. mild hypokinesis of inferior and basal septal walls.  LV size wnl. +medium-sized, moderate defects in the inferior, basal septal, and basal inferolateral walls that are mostly fixed suggestive of infarct with mild mervat-infarct ischemia.      Cath: n/a    -------------------------------------------------------------------------------------------

## 2025-04-28 NOTE — H&P ADULT - ASSESSMENT
69M w/ no significant PMHx, remote smoking when pt was in his 20s p/w intermittent nonexertional, nonpleuritic, nonradiating substernal CP since 7 days ago. Sent to ED by PCP for abnormal EKG in office, s/p  load by EMS. Found to have EKG findings and cardiac marker elevation concerning for early post infarction presentation.

## 2025-04-28 NOTE — H&P ADULT - NSHPLABSRESULTS_GEN_ALL_CORE
Labs reviewed : hs trop 1435 --> 1466   bnp 1675  Cr 1.01 Wbc 12    CXR  personally reviewed : Clear lungs.    EKG personally reviewed : SR @80bpm w/ occassional PACs. TWI and Q waves in inferior leads

## 2025-04-28 NOTE — ED PROVIDER NOTE - ATTENDING CONTRIBUTION TO CARE
I have seen and evaluated the patient face to face, endorse the HPI, PE, as edited and/or reviewed by me as needed and have indicated my contribution to care in the MDM section. I have seen and evaluated the patient face to face, endorse the HPI, PE, as edited and/or reviewed by me as needed and have indicated my contribution to care in the MDM section.    Patient was critically ill with a high probability of imminent or life threatening deterioration.  direct patient care (not related to procedure), additional history taking, interpretation of diagnostic studies, documentation, consultation with other physicians, telephone consultation with the patient's family  I have personally and independently provided the amount of critical care time documented below excluding time spent on separate procedures.  40 mins

## 2025-04-28 NOTE — ED PROVIDER NOTE - CLINICAL SUMMARY MEDICAL DECISION MAKING FREE TEXT BOX
69-year-old male with past medical history of smoker for 10 years, no other medicines go history no other medicines daily presents to the ED with complaints of resolved chest pain that occurred this past weekend.  Patient reports that the chest pain was midsternal, pressure-like slightly epigastric in nature and began on Wednesday and lasted until Saturday.  Patient assumed that it was gas pain because it radiated somewhat across his right lower chest.  It seemed to be at least slightly relieved by Pepcid, Tums.  Patient reports he had a very similar pain that occurred in  which was found to be noncardiac in origin.  Patient's mother  of MI at age 73, patient's father  at 53 of MI.  Patient went to go see his doctor on Saturday, but office was closed so he presented this morning, patient was sent here from PCPs office for an abnormal EKG showing new Q waves in inferior leads.  Again patient denies chest pain for the past 48 hours.  Given 324 chewable aspirin by EMS.  Comparison EKG from several months ago from PCP shows no Q waves on inferior leads. He never experienced SOB, nausea, vomiting, diaphoresis, NOEL. No fever, chills, cough, abdominal pain.     Gen: NAD, AAOx3, very well appearing, healthy appearing for age, well nourished  HEENT: NCAT, normal conjunctiva, oral mucosa moist  Lung: speaking in full sentences, good aeration bilaterally, lungs CTA b/l  CV: regular rate and rhythm. cap refill <2x. peripheral pulses 2+bilaterally   Abd: soft, ND, NT  MSK: no visible deformities  Neuro: No focal deficits  Skin: Intact  Psych: normal pleasant affect      Likely an ischemic cardiac event over the weekend, as EKG here is also showing inferior q waves, but no true ST segment changes. Will obtain cardiac labs Teddy: 69-year-old male with past medical history of smoker for 10 years, no other medicines go history no other medicines daily presents to the ED with complaints of resolved chest pain that occurred this past weekend.  Patient reports that the chest pain was midsternal, pressure-like slightly epigastric in nature and began on Wednesday and lasted until Saturday.  Patient assumed that it was gas pain because it radiated somewhat across his right lower chest.  It seemed to be at least slightly relieved by Pepcid, Tums.  Patient reports he had a very similar pain that occurred in  which was found to be noncardiac in origin.  Patient's mother  of MI at age 73, patient's father  at 53 of MI.  Patient went to go see his doctor on Saturday, but office was closed so he presented this morning, patient was sent here from PCPs office for an abnormal EKG showing new Q waves in inferior leads.  Again patient denies chest pain for the past 48 hours.  Given 324 chewable aspirin by EMS.  Comparison EKG from several months ago from PCP shows no Q waves on inferior leads. He never experienced SOB, nausea, vomiting, diaphoresis, NOEL. No fever, chills, cough, abdominal pain. Like with sub acute MI, will need admission for cath tomorrow.    Gen: NAD, AAOx3, very well appearing, healthy appearing for age, well nourished  HEENT: NCAT, normal conjunctiva, oral mucosa moist  Lung: speaking in full sentences, good aeration bilaterally, lungs CTA b/l  CV: regular rate and rhythm. cap refill <2x. peripheral pulses 2+bilaterally   Abd: soft, ND, NT  MSK: no visible deformities  Neuro: No focal deficits  Skin: Intact  Psych: normal pleasant affect      Likely an ischemic cardiac event over the weekend, as EKG here is also showing inferior q waves, but no true ST segment changes. Will obtain cardiac labs Espinal: 69-year-old male with past medical history of smoker for 10 years, no other medicines go history no other medicines daily presents to the ED with complaints of resolved chest pain that occurred this past weekend.  Patient reports that the chest pain was midsternal, pressure-like slightly epigastric in nature and began on Wednesday and lasted until Saturday.  Patient assumed that it was gas pain because it radiated somewhat across his right lower chest.  It seemed to be at least slightly relieved by Pepcid, Tums.  Patient reports he had a very similar pain that occurred in  which was found to be noncardiac in origin.  Patient's mother  of MI at age 73, patient's father  at 53 of MI.  Patient went to go see his doctor on Saturday, but office was closed so he presented this morning, patient was sent here from PCPs office for an abnormal EKG showing new Q waves in inferior leads.  Again patient denies chest pain for the past 48 hours.  Given 324 chewable aspirin by EMS.  Comparison EKG from several months ago from PCP shows no Q waves on inferior leads. He never experienced SOB, nausea, vomiting, diaphoresis, NOEL. No fever, chills, cough, abdominal pain.     Gen: NAD, AAOx3, very well appearing, healthy appearing for age, well nourished  HEENT: NCAT, normal conjunctiva, oral mucosa moist  Lung: speaking in full sentences, good aeration bilaterally, lungs CTA b/l  CV: regular rate and rhythm. cap refill <2x. peripheral pulses 2+bilaterally   Abd: soft, ND, NT  MSK: no visible deformities  Neuro: No focal deficits  Skin: Intact  Psych: normal pleasant affect      Likely an ischemic cardiac event over the weekend, as EKG here is also showing inferior q waves, but no true ST segment changes. Will obtain cardiac labs. Likely with sub acute MI, will need admission for cath tomorrow.

## 2025-04-28 NOTE — PATIENT PROFILE ADULT - FLU SEASON?
PER DR. VALENZUELA OK TO GIVE PT 12.5MG IVP PHENERGAN FOR PT C/O NAUSEA. AT 1323 PT REPORTS THAT PHENERGAN HAS HELPED HER NAUSEA.    Yes...

## 2025-04-28 NOTE — ED ADULT NURSE REASSESSMENT NOTE - NS ED NURSE REASSESS COMMENT FT1
pt with troponin 1453 pt denies chest pain pending cardiology consult pt remains asymptomatic pain free speaking on phone with no distress

## 2025-04-28 NOTE — ED ADULT NURSE NOTE - CHIEF COMPLAINT QUOTE
chest pain over the weekend, went to MD this morning and abnormal ekg  no chest pain at this time, given 324mg asprin by EMS

## 2025-04-28 NOTE — PATIENT PROFILE ADULT - TOBACCO USE
Never smoker Hatchet Flap Text: The defect edges were debeveled with a #15 scalpel blade.  Given the location of the defect, shape of the defect and the proximity to free margins a hatchet flap was deemed most appropriate.  Using a sterile surgical marker, an appropriate hatchet flap was drawn incorporating the defect and placing the expected incisions within the relaxed skin tension lines where possible.    The area thus outlined was incised deep to adipose tissue with a #15 scalpel blade.  The skin margins were undermined to an appropriate distance in all directions utilizing iris scissors  and carried over to close the primary defect.

## 2025-04-28 NOTE — H&P ADULT - NSHPSOCIALHISTORY_GEN_ALL_CORE
Lives with spouse  Denies tobacco ; remote smoking history ; quit when he was 18 years old  Denies Etoh/Illicit drug use

## 2025-04-28 NOTE — ED ADULT NURSE REASSESSMENT NOTE - NS ED NURSE REASSESS COMMENT FT1
report received from OTILIA Pemberton. patient resting in NAD, denying active CP. patient with heparin drip infusing at 10 ml/hr. per MD Enrique strange for patient to eat and drink, provided with sandwich and ginger ale. pending tele admission. updated on plan of care. comfort and safety maintained

## 2025-04-28 NOTE — ED ADULT TRIAGE NOTE - CHIEF COMPLAINT QUOTE
chest pain over the weekend, went to MD this morning and abnormal ekg  no chest pain at this time chest pain over the weekend, went to MD this morning and abnormal ekg  no chest pain at this time, given 324mg asprin by EMS

## 2025-04-28 NOTE — ED ADULT NURSE NOTE - OBJECTIVE STATEMENT
pt 70 yo male via ems from md office for abn ekg pt had chest pain last Wednesday 4/23 onset to epigastric area continued squeeze like next 2 days went to md office 4/26 it was closed so patient  went home and returned thismorning to md office ekg abnormal per ems pt denies any painat this time states it got better taking gasx and pepcid pt skin warm dry denies any fever or chills placed on cardiac monitor pt given asa 325mg prior to arrival

## 2025-04-28 NOTE — H&P ADULT - SOCIAL HISTORY
General--no acute distress, normal appearance Eyes--anicteric, no pallor HENT--normocephalic, atraumatic head Neck--no lymphadenopathy, symmetric Chest--non labored, equal chest rise Heart--regular rate Abdomen--soft, non tender, non distended, no organomegaly Skin--no rashes, no jaundice Neurologic--Alert and oriented x 3, answers questions appropriately Psychiatric--stable mood, appropriate affect 
No

## 2025-04-28 NOTE — H&P ADULT - PROBLEM SELECTOR PLAN 1
Hemodynamically stable  Trops: 1435 --> 1466  EKG: SR @80bpm w/ occasional PACs. TWI and Q waves in inferior leads  s/p  via EMS  s/p Ticagrelor 180, Heparin 4000u bolus in ED  Cardiology reccs appreciated  - c/w DAPT: ASA 81mg qd / Brilinta 90mg bid  - c/w Heparin gtt  - start high intensity statin: Atorvastatin 80mg qd  - start BB: Metoprolol 12.5mg BID  - f/up TTE, Lipid panel  - per Cardiology ; concern for early post infarction. Planned for delayed invasive intervention with [facility] 4/29

## 2025-04-28 NOTE — CONSULT NOTE ADULT - ASSESSMENT
#NSTE-ACS  Hemodynamically stable  Trops: 1435  pBNP: 1675  EKG: SR @80bpm w/ occassional PACs. TWI and Q waves in inferior leads  POCUS ED: No pericardial effusion, no WMA. IVC flat w/ Respiratory variation. Preserved EF  KAVITHA: 4  CHERYL: 113  s/p  via EMS  s/p Ticagrelor 180, Heparin 4000u bolus in ED  - started on Heparin gtt  - c/w DAPT: ASA 81mg qd / Brilinta 90mg bid  - obtain TTE  - Early post infarction. Pt is likely candidate for delayed invasive intervention    #NSTE-ACS  Hemodynamically stable  Trops: 1435  pBNP: 1675  EKG: SR @80bpm w/ occassional PACs. TWI and Q waves in inferior leads  POCUS ED: No pericardial effusion, no WMA. IVC flat w/ Respiratory variation. Preserved EF  KAVITHA: 4  CHERYL: 113  s/p  via EMS  s/p Ticagrelor 180, Heparin 4000u bolus in ED  - c/w DAPT: ASA 81mg qd / Brilinta 90mg bid  - started on Heparin gtt  - start high intensity statin: Atorvastatin 80mg qd  - start BB: Metoprolol 12.5mg BID  - obtain TTE, Lipid panel  - Early post infarction. Pt is likely candidate for delayed invasive intervention with [facility]   #NSTE-ACS  Hemodynamically stable  Trops: 1435  pBNP: 1675  EKG: SR @80bpm w/ occasional PACs. TWI and Q waves in inferior leads  POCUS ED: No pericardial effusion, no WMA. IVC flat w/ Respiratory variation. Preserved EF  SPECT 11/2019: non-ischemic findings. Frequent VPDs. Mild inferior/basal septal wall hypokinesis. Fixed medium-sized moderate inferior/basal septal/basal inferolateral wall defects  KAVITHA: 4  CHERYL: 113  s/p  via EMS  s/p Ticagrelor 180, Heparin 4000u bolus in ED  - c/w DAPT: ASA 81mg qd / Brilinta 90mg bid  - started on Heparin gtt  - start high intensity statin: Atorvastatin 80mg qd  - start BB: Metoprolol 12.5mg BID  - obtain TTE, Lipid panel  - Early post infarction. Pt is likely candidate for delayed invasive intervention with [facility] 69M w/ no significant PMHx, remote smoking when pt was in his 20s p/w intermittent nonexertional, nonpleuritic, nonradiating substernal CP since 7 days ago. Sent to ED by PCP for abnormal EKG in office, s/p  load by EMS. Found to have EKG findings and cardiac marker elevation concerning for early post infarction presentation.       #NSTE-ACS  Hemodynamically stable  Trops: 1435  pBNP: 1675  EKG: SR @80bpm w/ occasional PACs. TWI and Q waves in inferior leads  POCUS ED: No pericardial effusion, no WMA. IVC flat w/ Respiratory variation. Preserved EF  SPECT 11/2019: non-ischemic findings. Frequent VPDs. Mild inferior/basal septal wall hypokinesis. Fixed medium-sized moderate inferior/basal septal/basal inferolateral wall defects  KAVITHA: 4  CHERYL: 113  s/p  via EMS  s/p Ticagrelor 180, Heparin 4000u bolus in ED  - c/w DAPT: ASA 81mg qd / Brilinta 90mg bid  - started on Heparin gtt  - start high intensity statin: Atorvastatin 80mg qd  - start BB: Metoprolol 12.5mg BID  - obtain TTE, Lipid panel  - Early post infarction. Pt is likely candidate for delayed invasive intervention with [facility]

## 2025-04-29 ENCOUNTER — TRANSCRIPTION ENCOUNTER (OUTPATIENT)
Age: 70
End: 2025-04-29

## 2025-04-29 LAB
A1C WITH ESTIMATED AVERAGE GLUCOSE RESULT: 5.6 % — SIGNIFICANT CHANGE UP (ref 4–5.6)
APTT BLD: 40 SEC — HIGH (ref 26.1–36.8)
APTT BLD: 48.8 SEC — HIGH (ref 26.1–36.8)
CHOLEST SERPL-MCNC: 152 MG/DL — SIGNIFICANT CHANGE UP
CHOLEST SERPL-MCNC: 169 MG/DL — SIGNIFICANT CHANGE UP
ESTIMATED AVERAGE GLUCOSE: 114 MG/DL — SIGNIFICANT CHANGE UP (ref 68–114)
HCT VFR BLD CALC: 42.7 % — SIGNIFICANT CHANGE UP (ref 39–50)
HDLC SERPL-MCNC: 42 MG/DL — SIGNIFICANT CHANGE UP
HDLC SERPL-MCNC: 44 MG/DL — SIGNIFICANT CHANGE UP
HGB BLD-MCNC: 14.6 G/DL — SIGNIFICANT CHANGE UP (ref 13–17)
LDLC SERPL-MCNC: 92 MG/DL — SIGNIFICANT CHANGE UP
LDLC SERPL-MCNC: 97 MG/DL — SIGNIFICANT CHANGE UP
LIPID PNL WITH DIRECT LDL SERPL: 92 MG/DL — SIGNIFICANT CHANGE UP
LIPID PNL WITH DIRECT LDL SERPL: 97 MG/DL — SIGNIFICANT CHANGE UP
MCHC RBC-ENTMCNC: 27.3 PG — SIGNIFICANT CHANGE UP (ref 27–34)
MCHC RBC-ENTMCNC: 34.2 G/DL — SIGNIFICANT CHANGE UP (ref 32–36)
MCV RBC AUTO: 79.8 FL — LOW (ref 80–100)
NONHDLC SERPL-MCNC: 108 MG/DL — SIGNIFICANT CHANGE UP
NONHDLC SERPL-MCNC: 127 MG/DL — SIGNIFICANT CHANGE UP
NRBC BLD AUTO-RTO: 0 /100 WBCS — SIGNIFICANT CHANGE UP (ref 0–0)
PLATELET # BLD AUTO: 200 K/UL — SIGNIFICANT CHANGE UP (ref 150–400)
RBC # BLD: 5.35 M/UL — SIGNIFICANT CHANGE UP (ref 4.2–5.8)
RBC # FLD: 14.2 % — SIGNIFICANT CHANGE UP (ref 10.3–14.5)
TRIGL SERPL-MCNC: 172 MG/DL — HIGH
TRIGL SERPL-MCNC: 82 MG/DL — SIGNIFICANT CHANGE UP
TSH SERPL-MCNC: 2.05 UIU/ML — SIGNIFICANT CHANGE UP (ref 0.27–4.2)
WBC # BLD: 13.76 K/UL — HIGH (ref 3.8–10.5)
WBC # FLD AUTO: 13.76 K/UL — HIGH (ref 3.8–10.5)

## 2025-04-29 PROCEDURE — 99232 SBSQ HOSP IP/OBS MODERATE 35: CPT | Mod: GC

## 2025-04-29 PROCEDURE — 99152 MOD SED SAME PHYS/QHP 5/>YRS: CPT

## 2025-04-29 PROCEDURE — 93454 CORONARY ARTERY ANGIO S&I: CPT | Mod: 26,59

## 2025-04-29 PROCEDURE — 92928 PRQ TCAT PLMT NTRAC ST 1 LES: CPT | Mod: RC

## 2025-04-29 PROCEDURE — 93010 ELECTROCARDIOGRAM REPORT: CPT

## 2025-04-29 RX ORDER — TICAGRELOR 90 MG/1
90 TABLET ORAL EVERY 12 HOURS
Refills: 0 | Status: DISCONTINUED | OUTPATIENT
Start: 2025-04-29 | End: 2025-04-30

## 2025-04-29 RX ORDER — TICAGRELOR 90 MG/1
90 TABLET ORAL EVERY 12 HOURS
Refills: 0 | Status: DISCONTINUED | OUTPATIENT
Start: 2025-04-29 | End: 2025-04-29

## 2025-04-29 RX ADMIN — HEPARIN SODIUM 1300 UNIT(S)/HR: 1000 INJECTION INTRAVENOUS; SUBCUTANEOUS at 10:10

## 2025-04-29 RX ADMIN — TICAGRELOR 90 MILLIGRAM(S): 90 TABLET ORAL at 22:11

## 2025-04-29 RX ADMIN — TICAGRELOR 90 MILLIGRAM(S): 90 TABLET ORAL at 05:45

## 2025-04-29 RX ADMIN — METOPROLOL SUCCINATE 12.5 MILLIGRAM(S): 50 TABLET, EXTENDED RELEASE ORAL at 22:11

## 2025-04-29 RX ADMIN — HEPARIN SODIUM 1150 UNIT(S)/HR: 1000 INJECTION INTRAVENOUS; SUBCUTANEOUS at 03:48

## 2025-04-29 RX ADMIN — METOPROLOL SUCCINATE 12.5 MILLIGRAM(S): 50 TABLET, EXTENDED RELEASE ORAL at 05:45

## 2025-04-29 RX ADMIN — Medication 81 MILLIGRAM(S): at 11:21

## 2025-04-29 RX ADMIN — ATORVASTATIN CALCIUM 80 MILLIGRAM(S): 80 TABLET, FILM COATED ORAL at 22:12

## 2025-04-29 NOTE — DISCHARGE NOTE PROVIDER - PROVIDER TOKENS
PROVIDER:[TOKEN:[[id number]],FOLLOWUP:[1 week]] PROVIDER:[TOKEN:[[id number]],FOLLOWUP:[1 week]],PROVIDER:[TOKEN:[[id number]],FOLLOWUP:[1 week]]

## 2025-04-29 NOTE — DISCHARGE NOTE PROVIDER - NSDCFUSCHEDAPPT_GEN_ALL_CORE_FT
Joaquín AbdiFormerly [facility] Physician [facility]  CARDIOLOGY [address]  Scheduled Appointment: 05/14/2025

## 2025-04-29 NOTE — DISCHARGE NOTE PROVIDER - NSDCCPCAREPLAN_GEN_ALL_CORE_FT
PRINCIPAL DISCHARGE DIAGNOSIS  Diagnosis: ACS (acute coronary syndrome)  Assessment and Plan of Treatment: Echo with  EF of 45-50 %  Left ventricular systolic function is mildly decreased.  Regional wall motion abnormalities present.  Patient was on Heparin drip   outpatient follow up with cardiology      SECONDARY DISCHARGE DIAGNOSES  Diagnosis: NSTEMI (non-ST elevation myocardial infarction)  Assessment and Plan of Treatment: s/p Left heart cath with PCI x 2 to RCA, on 4/29  Continue with Aspirin 81 mg po daily  Continue Brillianta 90 mg po every 12 hours   Outpatient follow up with cardioology    Diagnosis: CAD (coronary artery disease)  Assessment and Plan of Treatment: s/p Stent placed   Coronary artery disease is a condition where the arteries the supply the heart muscle get clogged with fatty deposits & puts you at risk for a heart attack  Call your doctor if you have any new pain, pressure, or discomfort in the center of your chest, pain, tingling or discomfort in arms, back, neck, jaw, or stomach, shortness of breath, nausea, vomiting, burping or heartburn, sweating, cold and clammy skin, racing or abnormal heartbeat for more than 10 minutes or if they keep coming & going.  Call 911 and do not try to get to hospital by self   You can help yourself with lifestyle changes (quitting smoking if you smoke), eat lots of fruits & vegetables & low fat dairy products, not a lot of meat & fatty foods, walk or some form of physical activity most days of the week, lose weight if you are overweight  Take your cardiac medication as prescribed to lower cholesterol, to lower blood pressure, aspirin to prevent blood clots, and diabetes control  Make sure to keep appointments with doctor for cardiac follow up care

## 2025-04-29 NOTE — PROGRESS NOTE ADULT - SUBJECTIVE AND OBJECTIVE BOX
SUBJECTIVE / OVERNIGHT EVENTS:pt seen and examined  04-29-25    MEDICATIONS  (STANDING):  aspirin  chewable 81 milliGRAM(s) Oral daily  atorvastatin 80 milliGRAM(s) Oral at bedtime  metoprolol tartrate 12.5 milliGRAM(s) Oral two times a day  sodium chloride 0.9%. 1000 milliLiter(s) (100 mL/Hr) IV Continuous <Continuous>  ticagrelor 90 milliGRAM(s) Oral every 12 hours    MEDICATIONS  (PRN):  heparin   Injectable 4000 Unit(s) IV Push every 6 hours PRN For aPTT less than 40    T(C): 36.8 (04-29-25 @ 21:06), Max: 37.4 (04-29-25 @ 11:07)  HR: 89 (04-29-25 @ 21:06) (73 - 102)  BP: 134/84 (04-29-25 @ 21:06) (113/66 - 139/79)  RR: 18 (04-29-25 @ 21:06) (16 - 18)  SpO2: 96% (04-29-25 @ 21:06) (96% - 99%)    CAPILLARY BLOOD GLUCOSE        I&O's Summary    29 Apr 2025 07:01  -  29 Apr 2025 23:13  --------------------------------------------------------  IN: 480 mL / OUT: 0 mL / NET: 480 mL        Constitutional: No fever, fatigue  Skin: No rash.  Eyes: No recent vision problems or eye pain.  ENT: No congestion, ear pain, or sore throat.  Cardiovascular: No chest pain or palpation.  Respiratory: No cough, shortness of breath, congestion, or wheezing.  Gastrointestinal: No abdominal pain, nausea, vomiting, or diarrhea.  Genitourinary: No dysuria.  Musculoskeletal: No joint swelling.  Neurologic: No headache.    PHYSICAL EXAM:  GENERAL: NAD  EYES: EOMI, PERRLA  NECK: Supple, No JVD  CHEST/LUNG: cta marilia  HEART:  S1 , S2 +  ABDOMEN: soft, bs+  EXTREMITIES:  no edema  NEUROLOGY:alert awake      LABS:                        14.6   13.76 )-----------( 200      ( 29 Apr 2025 03:26 )             42.7     04-28    147[H]  |  108  |  14  ----------------------------<  113[H]  4.1   |  22  |  1.01    Ca    9.7      28 Apr 2025 10:59    TPro  7.6  /  Alb  4.4  /  TBili  0.9  /  DBili  x   /  AST  125[H]  /  ALT  46[H]  /  AlkPhos  71  04-28    PT/INR - ( 28 Apr 2025 10:59 )   PT: 12.7 sec;   INR: 1.12 ratio         PTT - ( 29 Apr 2025 09:40 )  PTT:48.8 sec      Urinalysis Basic - ( 28 Apr 2025 10:59 )    Color: x / Appearance: x / SG: x / pH: x  Gluc: 113 mg/dL / Ketone: x  / Bili: x / Urobili: x   Blood: x / Protein: x / Nitrite: x   Leuk Esterase: x / RBC: x / WBC x   Sq Epi: x / Non Sq Epi: x / Bacteria: x        RADIOLOGY & ADDITIONAL TESTS:    Imaging Personally Reviewed:    Consultant(s) Notes Reviewed:      Care Discussed with Consultants/Other Providers:

## 2025-04-29 NOTE — PROGRESS NOTE ADULT - PROBLEM SELECTOR PLAN 1
EKG: SR @80bpm w/ occasional PACs. TWI and Q waves in inferior leads  TTE 4/28/2025: Left ventricular cavity is normal in size. EF 45-50%, mildly decreased LV systolic function. Regional WMA present. Basal and mid inferior wall, mid inferolateral segment, basal and mid inferior septum, and basal inferolateral segment are abnormal.  KAVITHA: 4  - c/w on Heparin gtt  - c/w  high intensity statin: Atorvastatin 80mg qd  - c/w BB: Metoprolol 12.5mg BID  - Early post infarction. Plan for delayed invasive intervention with [facility] today 4/29/2025

## 2025-04-29 NOTE — DISCHARGE NOTE PROVIDER - CARE PROVIDERS DIRECT ADDRESSES
,DirectAddress_Unknown ,DirectAddress_Unknown,[email or web address]

## 2025-04-29 NOTE — PROGRESS NOTE ADULT - ASSESSMENT
69M w/ no significant PMHx, remote smoking when pt was in his 20s p/w intermittent nonexertional, nonpleuritic, nonradiating substernal CP since 7 days ago. Sent to ED by PCP for abnormal EKG in office, s/p  load by EMS. Found to have EKG findings and cardiac marker elevation concerning for early post infarction presentation.       #NSTE-ACS  Hemodynamically stable  Trops: 1435  pBNP: 1675  Lipid panel: Chol 152, HDL 44, LDL 92TG 82   EKG: SR @80bpm w/ occasional PACs. TWI and Q waves in inferior leads  POCUS ED: No pericardial effusion, no WMA. IVC flat w/ Respiratory variation. Preserved EF  SPECT 11/2019: non-ischemic findings. Frequent VPDs. Mild inferior/basal septal wall hypokinesis. Fixed medium-sized moderate inferior/basal septal/basal inferolateral wall defects  TTE 4/28/2025: Left ventricular cavity is normal in size. EF 45-50%, mildly decreased LV systolic function. Regional WMA present. Basal and mid inferior wall, mid inferolateral segment, basal and mid inferior septum, and basal inferolateral segment are abnormal.  KAVITHA: 4  CHERYL: 113  s/p  via EMS  s/p Ticagrelor 180, Heparin 4000u bolus in ED  - c/w DAPT: ASA 81mg qd / Brilinta 90mg bid  - c/w on Heparin gtt  - c/w  high intensity statin: Atorvastatin 80mg qd  - c/w BB: Metoprolol 12.5mg BID  - Early post infarction. Plan for delayed invasive intervention with [facility] today 4/29/2025 69M w/ no significant PMHx, remote smoking when pt was in his 20s p/w intermittent nonexertional, nonpleuritic, nonradiating substernal CP since 7 days ago. Sent to ED by PCP for abnormal EKG in office, s/p  load by EMS. Found to have EKG findings and cardiac marker elevation concerning for early post infarction presentation.       #NSTE-ACS  Hemodynamically stable  Trops: 1435  pBNP: 1675  Lipid panel: Chol 152, HDL 44, LDL 92, TG 82   EKG: SR @80bpm w/ occasional PACs. TWI and Q waves in inferior leads  POCUS ED: No pericardial effusion, no WMA. IVC flat w/ Respiratory variation. Preserved EF  SPECT 11/2019: non-ischemic findings. Frequent VPDs. Mild inferior/basal septal wall hypokinesis. Fixed medium-sized moderate inferior/basal septal/basal inferolateral wall defects  TTE 4/28/2025: Left ventricular cavity is normal in size. EF 45-50%, mildly decreased LV systolic function. Regional WMA present. Basal and mid inferior wall, mid inferolateral segment, basal and mid inferior septum, and basal inferolateral segment are abnormal.  KAVITHA: 4  CHERYL: 113  s/p  via EMS  s/p Ticagrelor 180, Heparin 4000u bolus in ED  - c/w DAPT: ASA 81mg qd / Brilinta 90mg bid  - c/w on Heparin gtt  - c/w  high intensity statin: Atorvastatin 80mg qd  - c/w BB: Metoprolol 12.5mg BID  - Early post infarction. Plan for delayed invasive intervention with [facility] today 4/29/2025

## 2025-04-29 NOTE — DISCHARGE NOTE PROVIDER - NSDCFUADDAPPT_GEN_ALL_CORE_FT
We have provided you with a prescription for cardiac rehab which is medically supervised exercise program for your heart and has been shown to improve the quantity and quality of life of people with heart disease like yours. You should attend cardiac rehab 3 times per week for 12 weeks. We have provided you with a list of nearby facilities. Please call your insurance carrier to determine which of these facilities are covered under your plan. Please bring this prescription with you to your follow up appointment with your cardiologist who can then further assist you to enroll into a cardiac rehab program. We have provided you with a prescription for cardiac rehab which is medically supervised exercise program for your heart and has been shown to improve the quantity and quality of life of people with heart disease like yours. You should attend cardiac rehab 3 times per week for 12 weeks. We have provided you with a list of nearby facilities. Please call your insurance carrier to determine which of these facilities are covered under your plan. Please bring this prescription with you to your follow up appointment with your cardiologist who can then further assist you to enroll into a cardiac rehab program.  APPTS ARE READY TO BE MADE: [ ] YES    Best Family or Patient Contact (if needed):    Additional Information about above appointments (if needed):    1:   2:   3:     Other comments or requests:    We have provided you with a prescription for cardiac rehab which is medically supervised exercise program for your heart and has been shown to improve the quantity and quality of life of people with heart disease like yours. You should attend cardiac rehab 3 times per week for 12 weeks. We have provided you with a list of nearby facilities. Please call your insurance carrier to determine which of these facilities are covered under your plan. Please bring this prescription with you to your follow up appointment with your cardiologist who can then further assist you to enroll into a cardiac rehab program.  APPTS ARE READY TO BE MADE: [x] YES    Best Family or Patient Contact (if needed):    Additional Information about above appointments (if needed):    1: Cardiology within 3 days   2:   3:     Other comments or requests:    We have provided you with a prescription for cardiac rehab which is medically supervised exercise program for your heart and has been shown to improve the quantity and quality of life of people with heart disease like yours. You should attend cardiac rehab 3 times per week for 12 weeks. We have provided you with a list of nearby facilities. Please call your insurance carrier to determine which of these facilities are covered under your plan. Please bring this prescription with you to your follow up appointment with your cardiologist who can then further assist you to enroll into a cardiac rehab program.  APPTS ARE READY TO BE MADE: [x] YES    Best Family or Patient Contact (if needed):    Additional Information about above appointments (if needed):    1: Cardiology within 3 days   2:   3:     Other comments or requests:     INTMED - Provided patient with provider referral information, however patient prefers to schedule the appointments on their own.     CARDIO - Prior to outreaching the patient, it was visible that the patient has secured a follow up appointment which was not scheduled by our team. Patient is scheduled with Dr. Abdi 5/14 8:10am [address]     CARDIOVASC - Patient advised they did not want to proceed with scheduling appointments with the providers on their referrals. They will coordinate care on their own.

## 2025-04-29 NOTE — DISCHARGE NOTE PROVIDER - NSDCFUADDINST_GEN_ALL_CORE_FT
diet:  regular diet with Low salt low cholesterol   HALA       We have provided you with a prescription for cardiac rehab which is medically supervised exercise program for your heart and has been shown to improve the quantity and quality of life of people with heart disease like yours. You should attend cardiac rehab 3 times per week for 12 weeks. We have provided you with a list of nearby facilities. Please call your insurance carrier to determine which of these facilities are covered under your plan. Please bring this prescription with you to your follow up appointment with your cardiologist who can then further assist you to enroll into a cardiac rehab program.

## 2025-04-29 NOTE — DISCHARGE NOTE PROVIDER - NSDCMRMEDTOKEN_GEN_ALL_CORE_FT
latanoprost 0.005% ophthalmic solution: 1 drop(s) in each eye  Nutrim Powder: mixed with water and taken once a day  OTC Medications: Multivitamin, B-complex, Vitamin C, Glucosamine, Flaxseed Oil: 1 tablet/capsule orally once a day   aspirin 81 mg oral tablet, chewable: 1 tab(s) orally once a day  atorvastatin 80 mg oral tablet: 1 tab(s) orally once a day (at bedtime)  Cardiac rehab: 2-3 times per week x 12 weeks  Diagnosis: Post PCI ***Please note, in addition to this you will also require a formal referral and/or prescription from you outpatient Cardiologist in order to enroll in rehab MDD: 1  latanoprost 0.005% ophthalmic solution: 1 drop(s) in each eye  metoprolol succinate 25 mg oral tablet, extended release: 1 tab(s) orally once a day  Nutrim Powder: mixed with water and taken once a day  OTC Medications: Multivitamin, B-complex, Vitamin C, Glucosamine, Flaxseed Oil: 1 tablet/capsule orally once a day  ticagrelor 90 mg oral tablet: 1 tab(s) orally every 12 hours

## 2025-04-29 NOTE — PROGRESS NOTE ADULT - SUBJECTIVE AND OBJECTIVE BOX
Cardiology Progress Note  ------------------------------------------------------------------------------------------  SUBJECTIVE:   - Tele: NSR  - No events overnight. Denies CP, SOB or Palpitations.     -------------------------------------------------------------------------------------------  ROS  Constitutional: fever absent, malaise absent  HEENT: eye redness absent, congestion absent  Lung: SOB absent, cough absent  Heart: + substernal chest pain, palpitations absent  Abdomen: abdominal pain absent, nausea absent, vomiting absent, hematuria absent, hematochezia absent  Extremities: peripheral edema absent, injury absent  Neurological: dizziness absent, headache absent  -------------------------------------------------------------------------------------------  VS:  T(F): 99 (), Max: 99.8 ()  HR: 101 () (77 - 101)  BP: 136/77 () (123/75 - 155/80)  RR: 18 ()  SpO2: 96% ()  I&O's Summary    PHYSICAL EXAM:  GENERAL: NAD  HEAD: Atraumatic, Normocephalic.  ENT: Moist mucous membranes.  NECK: Supple, No JVD.  CHEST/LUNG: Clear to auscultation bilaterally; No rales, rhonchi, wheezing, or rubs. Unlabored respirations.  HEART: Regular rate and rhythm; No murmurs, rubs, or gallops.  ABDOMEN: Bowel sounds present; Soft, Nontender, Nondistended.   EXTREMITIES: No edema. 2+ Peripheral Pulses, brisk capillary refill. No clubbing or cyanosis.     -------------------------------------------------------------------------------------------  LABS:                          14.6   13.76 )-----------( 200      ( 2025 03:26 )             42.7         147[H]  |  108  |  14  ----------------------------<  113[H]  4.1   |  22  |  1.01    Ca    9.7      2025 10:59    TPro  7.6  /  Alb  4.4  /  TBili  0.9  /  DBili  x   /  AST  125[H]  /  ALT  46[H]  /  AlkPhos  71  -28    PT/INR - ( 2025 10:59 )   PT: 12.7 sec;   INR: 1.12 ratio         PTT - ( 2025 09:40 )  PTT:48.8 sec  CARDIAC MARKERS ( 2025 14:36 )  1466 ng/L / x     / x     / x     / x     / x      CARDIAC MARKERS ( 2025 10:59 )  1435 ng/L / x     / x     / x     / x     / x          Total Cholesterol: 152  LDL: --  HDL: 44  T        -------------------------------------------------------------------------------------------  Meds:  aspirin  chewable 81 milliGRAM(s) Oral daily  atorvastatin 80 milliGRAM(s) Oral at bedtime  heparin   Injectable 4000 Unit(s) IV Push every 6 hours PRN  heparin  Infusion.  Unit(s)/Hr IV Continuous <Continuous>  metoprolol tartrate 12.5 milliGRAM(s) Oral two times a day  ticagrelor 90 milliGRAM(s) Oral two times a day    -------------------------------------------------------------------------------------------  Cardiovascular Diagnostic Testing:    ECG: SR @80bpm w/ occasional PACs. TWI and Q waves in inferior leads    Echo:   TTE 2025:  1. Left ventricular cavity is normal in size. Left ventricular systolic function is mildly decreased with an ejection fraction visually estimated at 45 to 50 %. Regional wall motion abnormalities present.   2. Basal and mid inferior wall, mid inferolateral segment, basal and mid inferior septum, and basal inferolateral segment are abnormal.    Stress Testing:  SPECT 2019: non-ischemic findings. Frequent VPDs. Mild inferior/basal septal wall hypokinesis. Fixed medium-sized moderate inferior/basal septal/basal inferolateral wall defects    Cath: pending    -------------------------------------------------------------------------------------------   Cardiology Progress Note  ------------------------------------------------------------------------------------------  SUBJECTIVE:   - Tele: NSR  - No events overnight. Denies CP, SOB or Palpitations.     -------------------------------------------------------------------------------------------  ROS  Constitutional: fever absent, malaise absent  HEENT: eye redness absent, congestion absent  Lung: SOB absent, cough absent  Heart: + substernal chest pain (resolved), palpitations absent  Abdomen: abdominal pain absent, nausea absent, vomiting absent, hematuria absent, hematochezia absent  Extremities: peripheral edema absent, injury absent  Neurological: dizziness absent, headache absent  -------------------------------------------------------------------------------------------  VS:  T(F): 99 (), Max: 99.8 ()  HR: 101 () (77 - 101)  BP: 136/77 () (123/75 - 155/80)  RR: 18 ()  SpO2: 96% ()  I&O's Summary    PHYSICAL EXAM:  GENERAL: NAD  HEAD: Atraumatic, Normocephalic.  ENT: Moist mucous membranes.  NECK: Supple, No JVD.  CHEST/LUNG: Clear to auscultation bilaterally; No rales, rhonchi, wheezing, or rubs. Unlabored respirations.  HEART: Regular rate and rhythm; No murmurs, rubs, or gallops.  ABDOMEN: Bowel sounds present; Soft, Nontender, Nondistended.   EXTREMITIES: No edema. 2+ Peripheral Pulses, brisk capillary refill. No clubbing or cyanosis.     -------------------------------------------------------------------------------------------  LABS:                          14.6   13.76 )-----------( 200      ( 2025 03:26 )             42.7     04-28    147[H]  |  108  |  14  ----------------------------<  113[H]  4.1   |  22  |  1.01    Ca    9.7      2025 10:59    TPro  7.6  /  Alb  4.4  /  TBili  0.9  /  DBili  x   /  AST  125[H]  /  ALT  46[H]  /  AlkPhos  71  04-28    PT/INR - ( 2025 10:59 )   PT: 12.7 sec;   INR: 1.12 ratio         PTT - ( 2025 09:40 )  PTT:48.8 sec  CARDIAC MARKERS ( 2025 14:36 )  1466 ng/L / x     / x     / x     / x     / x      CARDIAC MARKERS ( 2025 10:59 )  1435 ng/L / x     / x     / x     / x     / x          Total Cholesterol: 152  LDL: 92  HDL: 44  T        -------------------------------------------------------------------------------------------  Meds:  aspirin  chewable 81 milliGRAM(s) Oral daily  atorvastatin 80 milliGRAM(s) Oral at bedtime  heparin   Injectable 4000 Unit(s) IV Push every 6 hours PRN  heparin  Infusion.  Unit(s)/Hr IV Continuous <Continuous>  metoprolol tartrate 12.5 milliGRAM(s) Oral two times a day  ticagrelor 90 milliGRAM(s) Oral two times a day    -------------------------------------------------------------------------------------------  Cardiovascular Diagnostic Testing:    ECG: SR @80bpm w/ occasional PACs. TWI and Q waves in inferior leads    Echo:   TTE 2025:  1. Left ventricular cavity is normal in size. Left ventricular systolic function is mildly decreased with an ejection fraction visually estimated at 45 to 50 %. Regional wall motion abnormalities present.   2. Basal and mid inferior wall, mid inferolateral segment, basal and mid inferior septum, and basal inferolateral segment are abnormal.    Stress Testing:  SPECT 2019: non-ischemic findings. Frequent VPDs. Mild inferior/basal septal wall hypokinesis. Fixed medium-sized moderate inferior/basal septal/basal inferolateral wall defects    Cath: pending    -------------------------------------------------------------------------------------------

## 2025-04-29 NOTE — DISCHARGE NOTE PROVIDER - HOSPITAL COURSE
No HPI:  69M w/ no significant PMHx, remote smoking when pt was in his 20s p/w intermittent nonexertional, nonpleuritic, nonradiating substernal CP that began last Wednesday and was partially relieved with TUMS/pepcid. Pt was sitting on a chair at onset of symptoms and has had daily symptoms, mostly in the evening with last episode occurring 2 days ago. He reports similar CP about 7 yrs ago, found to have unremarkable Sestamibi stress test at that time. He was started on TUMS/pepcid for atypical CP attributed to GERD. Pt visited his PCP today and was sent to the ED for abnormal EKG. En route to ED,  load was given by EMS.    At baseline, pt reports active lifestyle: spends 25mins on treadmill with 25-lb weight lifting daily. He denies headache, dizziness, fever/chills, cough, SOB, palp, orthopnea, diaphoresis, PND, Abd pain, n/v.  Seen by Cardiology for  concern for NSTEMI. Planned for LHC on 4/29.  Currently pt denies any acute c/o.    ED COURSE  VS : /80        RR  17  O2 100% RA     T97.2F  Labs: hs trop 1435--> 1466   bnp 1675  Cr 1.01 Wbc 12  Treatment :  Brilinta 180mg po x 1, heparin gtt  (28 Apr 2025 15:52)    Hospital Course: 69-year-old male patient with no significant past medical history and remote smoking history in his 20s presented to the emergency department with a 7-day history of intermittent nonexertional, nonpleuritic, nonradiating substernal chest pain. The patient was initially sent to the ED by his primary care provider after an abnormal EKG was noted in the office. En route to the hospital, he received a 324 mg aspirin load from EMS. Upon presentation, his cardiac biomarkers were elevated, indicating non-ST elevation myocardial infarction (NSTEMI).  Patient with  abnormal EKG, with q waves in the inferior leads and T-wave inversions.  (TTE) revealed mildly decreased left ventricular systolic function with an ejection fraction of 45-50%, with regional wall motion abnormalities     On 4/29/2025, the patient underwent a successful percutaneous coronary intervention with two drug-eluting stents placed in a 100% occluded mid-right coronary artery. Post-procedurally, the patient remained stable from a cardiac standpoint. He was advised to continue optimal medical therapy, including DAPT for at least six months, beta-blockers, and high-intensity statins.    Outpatient follow up with his PCP and cardiology.  He is hemodynamically stable for discharge home     Important Medication Changes and Reason:  continue Brilinta 90 mg po every 12 hour     Active or Pending Issues Requiring Follow-up:  outpatient follow up cardiology     Advanced Directives:   [ x] Full code  [ ] DNR  [ ] Hospice    Discharge Diagnoses:  NSTEMI

## 2025-04-29 NOTE — DISCHARGE NOTE PROVIDER - CARE PROVIDER_API CALL
Steven King  Internal Medicine  [address] [place] [address]  Phone: [phone]  Fax: [phone]  Follow Up Time: 1 week   Steven King  Internal Medicine  [address] [place] [address]  Phone: [phone]  Fax: [phone]  Follow Up Time: 1 week    Getachew Feldman  Cardiovascular Disease  [facility] [address] [address]  Phone: [phone]  Fax: [phone]  Follow Up Time: 1 week

## 2025-04-30 ENCOUNTER — TRANSCRIPTION ENCOUNTER (OUTPATIENT)
Age: 70
End: 2025-04-30

## 2025-04-30 VITALS
DIASTOLIC BLOOD PRESSURE: 75 MMHG | RESPIRATION RATE: 18 BRPM | TEMPERATURE: 99 F | OXYGEN SATURATION: 96 % | HEART RATE: 85 BPM | SYSTOLIC BLOOD PRESSURE: 125 MMHG

## 2025-04-30 LAB
HCT VFR BLD CALC: 38.5 % — LOW (ref 39–50)
HGB BLD-MCNC: 12.8 G/DL — LOW (ref 13–17)
MCHC RBC-ENTMCNC: 27.8 PG — SIGNIFICANT CHANGE UP (ref 27–34)
MCHC RBC-ENTMCNC: 33.2 G/DL — SIGNIFICANT CHANGE UP (ref 32–36)
MCV RBC AUTO: 83.5 FL — SIGNIFICANT CHANGE UP (ref 80–100)
NRBC BLD AUTO-RTO: 0 /100 WBCS — SIGNIFICANT CHANGE UP (ref 0–0)
PLATELET # BLD AUTO: 193 K/UL — SIGNIFICANT CHANGE UP (ref 150–400)
RBC # BLD: 4.61 M/UL — SIGNIFICANT CHANGE UP (ref 4.2–5.8)
RBC # FLD: 14.4 % — SIGNIFICANT CHANGE UP (ref 10.3–14.5)
WBC # BLD: 9.53 K/UL — SIGNIFICANT CHANGE UP (ref 3.8–10.5)
WBC # FLD AUTO: 9.53 K/UL — SIGNIFICANT CHANGE UP (ref 3.8–10.5)

## 2025-04-30 PROCEDURE — 99285 EMERGENCY DEPT VISIT HI MDM: CPT | Mod: 25

## 2025-04-30 PROCEDURE — C8929: CPT

## 2025-04-30 PROCEDURE — 93005 ELECTROCARDIOGRAM TRACING: CPT

## 2025-04-30 PROCEDURE — C1769: CPT

## 2025-04-30 PROCEDURE — 71045 X-RAY EXAM CHEST 1 VIEW: CPT

## 2025-04-30 PROCEDURE — 84443 ASSAY THYROID STIM HORMONE: CPT

## 2025-04-30 PROCEDURE — 84484 ASSAY OF TROPONIN QUANT: CPT

## 2025-04-30 PROCEDURE — 99232 SBSQ HOSP IP/OBS MODERATE 35: CPT | Mod: GC

## 2025-04-30 PROCEDURE — 99231 SBSQ HOSP IP/OBS SF/LOW 25: CPT | Mod: FS

## 2025-04-30 PROCEDURE — 85730 THROMBOPLASTIN TIME PARTIAL: CPT

## 2025-04-30 PROCEDURE — 93308 TTE F-UP OR LMTD: CPT

## 2025-04-30 PROCEDURE — 85025 COMPLETE CBC W/AUTO DIFF WBC: CPT

## 2025-04-30 PROCEDURE — 96374 THER/PROPH/DIAG INJ IV PUSH: CPT

## 2025-04-30 PROCEDURE — 80061 LIPID PANEL: CPT

## 2025-04-30 PROCEDURE — C1874: CPT

## 2025-04-30 PROCEDURE — C9600: CPT | Mod: RC

## 2025-04-30 PROCEDURE — 83690 ASSAY OF LIPASE: CPT

## 2025-04-30 PROCEDURE — C1887: CPT

## 2025-04-30 PROCEDURE — 83036 HEMOGLOBIN GLYCOSYLATED A1C: CPT

## 2025-04-30 PROCEDURE — C1725: CPT

## 2025-04-30 PROCEDURE — 85027 COMPLETE CBC AUTOMATED: CPT

## 2025-04-30 PROCEDURE — 85610 PROTHROMBIN TIME: CPT

## 2025-04-30 PROCEDURE — 80053 COMPREHEN METABOLIC PANEL: CPT

## 2025-04-30 PROCEDURE — 83880 ASSAY OF NATRIURETIC PEPTIDE: CPT

## 2025-04-30 PROCEDURE — 93454 CORONARY ARTERY ANGIO S&I: CPT | Mod: 59

## 2025-04-30 PROCEDURE — C1894: CPT

## 2025-04-30 RX ORDER — ATORVASTATIN CALCIUM 80 MG/1
1 TABLET, FILM COATED ORAL
Qty: 0 | Refills: 0 | DISCHARGE
Start: 2025-04-30

## 2025-04-30 RX ORDER — ASPIRIN 325 MG
1 TABLET ORAL
Qty: 0 | Refills: 0 | DISCHARGE
Start: 2025-04-30

## 2025-04-30 RX ORDER — TICAGRELOR 90 MG/1
1 TABLET ORAL
Qty: 120 | Refills: 0
Start: 2025-04-30 | End: 2025-06-28

## 2025-04-30 RX ORDER — ATORVASTATIN CALCIUM 80 MG/1
1 TABLET, FILM COATED ORAL
Qty: 30 | Refills: 0
Start: 2025-04-30 | End: 2025-05-29

## 2025-04-30 RX ORDER — ASPIRIN 325 MG
1 TABLET ORAL
Qty: 30 | Refills: 0
Start: 2025-04-30 | End: 2025-05-29

## 2025-04-30 RX ORDER — METOPROLOL SUCCINATE 50 MG/1
1 TABLET, EXTENDED RELEASE ORAL
Qty: 30 | Refills: 0
Start: 2025-04-30 | End: 2025-05-29

## 2025-04-30 RX ORDER — TICAGRELOR 90 MG/1
1 TABLET ORAL
Qty: 180 | Refills: 0
Start: 2025-04-30 | End: 2025-07-28

## 2025-04-30 RX ADMIN — TICAGRELOR 90 MILLIGRAM(S): 90 TABLET ORAL at 17:06

## 2025-04-30 RX ADMIN — METOPROLOL SUCCINATE 12.5 MILLIGRAM(S): 50 TABLET, EXTENDED RELEASE ORAL at 17:07

## 2025-04-30 RX ADMIN — Medication 81 MILLIGRAM(S): at 11:14

## 2025-04-30 RX ADMIN — TICAGRELOR 90 MILLIGRAM(S): 90 TABLET ORAL at 05:03

## 2025-04-30 RX ADMIN — METOPROLOL SUCCINATE 12.5 MILLIGRAM(S): 50 TABLET, EXTENDED RELEASE ORAL at 05:03

## 2025-04-30 NOTE — CHART NOTE - NSCHARTNOTEFT_GEN_A_CORE
Dietitian consult received for: "Pt wants to get advice from dietitian for future diet before going home"    Patient visited. Patient's wife and daughter present at bedside during visit. Heart healthy diet education, ways to reduce sodium intake, and more healthful/alternate ways to flavor food was reviewed, handouts provided. Both the patient and family demonstrated a fair-level of understanding. Handouts provided: "Heart Healthy Nutrition Therapy", "Heart-Healthy Cooking Tips", "Sodium Free Flavoring Tips". RD remains available should additional diet education become indicated.     Ginna Collier, NICOLE, CDN   Available on MS teams

## 2025-04-30 NOTE — DISCHARGE NOTE NURSING/CASE MANAGEMENT/SOCIAL WORK - FINANCIAL ASSISTANCE
[facility] provides services at a reduced cost to those who are determined to be eligible through [facility]’s financial assistance program. Information regarding [facility]’s financial assistance program can be found by going to [email or web address] or by calling [phone].

## 2025-04-30 NOTE — PROGRESS NOTE ADULT - ASSESSMENT
Assessment/Plan: 69M w/ no significant PMHx, remote smoking when pt was in his 20s p/w intermittent nonexertional, nonpleuritic, nonradiating substernal CP since 7 days ago.   -4/29 s/p [facility] with PCI ELMER x 2 RCA via RRA access by Dr Abdi   - Radial site stable.   - Continue DAPT (aspirin 81mg and clopidogrel 75mg)  - Continue atorvastatin  - Recommend a heart healthy diet which includes a variety of fruits and vegetables, whole grains, low fat dairy products, legumes and skinless poulty and fish; food prepared with little or no salt and minimize processed foods  - Avoid using NSAIDs  (Aleve, Motrin, ibuprofen, naproxen) while on DAPT, please utilize Tylenol for pain control (not to exceed 4gm in 24 hours)  - Care per primary team.  - f /u with Dr Abdi in 2 weeks post discharg- cardiac Rehab recommended

## 2025-04-30 NOTE — PROGRESS NOTE ADULT - SUBJECTIVE AND OBJECTIVE BOX
Cardiology Progress Note  ------------------------------------------------------------------------------------------  SUBJECTIVE:   - Tele: NSR  - No events overnight. Seen/examined at bedside. Lying in bed, NAD. Denies CP, SOB or Palpitations.   RRA covered w/ dressing, c/d/i    -------------------------------------------------------------------------------------------  ROS  Constitutional: fever absent, malaise absent  HEENT: eye redness absent, congestion absent  Lung: SOB absent, cough absent  Heart: substernal chest pain (resolved), palpitations absent  Abdomen: abdominal pain absent, nausea absent, vomiting absent, hematuria absent, hematochezia absent  Extremities: peripheral edema absent, injury absent  Neurological: dizziness absent, headache absent  -------------------------------------------------------------------------------------------  VS:  T(F): 98.5 (), Max: 99.3 ()  HR: 81 () (73 - 102)  BP: 116/76 () (113/66 - 139/79)  RR: 18 ()  SpO2: 97% ()  I&O's Summary    2025 07:01  -  2025 07:00  --------------------------------------------------------  IN: 480 mL / OUT: 0 mL / NET: 480 mL      PHYSICAL EXAM:  GENERAL: NAD  HEAD: Atraumatic, Normocephalic.  ENT: Moist mucous membranes.  NECK: Supple, No JVD.  CHEST/LUNG: Clear to auscultation bilaterally; No rales, rhonchi, wheezing, or rubs. Unlabored respirations.  HEART: Regular rate and rhythm; No murmurs, rubs, or gallops.  ABDOMEN: Bowel sounds present; Soft, Nontender, Nondistended.   EXTREMITIES: No edema. 2+ Peripheral Pulses, brisk capillary refill. No clubbing or cyanosis. RRA w/ dressing c/d/i  NEURO: AAOx3, speech clear. No FND  -------------------------------------------------------------------------------------------  LABS:                          12.8   9.53  )-----------( 193      ( 2025 06:59 )             38.5     04-28    147[H]  |  108  |  14  ----------------------------<  113[H]  4.1   |  22  |  1.01    Ca    9.7      2025 10:59    TPro  7.6  /  Alb  4.4  /  TBili  0.9  /  DBili  x   /  AST  125[H]  /  ALT  46[H]  /  AlkPhos  71  04-28    PT/INR - ( 2025 10:59 )   PT: 12.7 sec;   INR: 1.12 ratio         PTT - ( 2025 09:40 )  PTT:48.8 sec  CARDIAC MARKERS ( 2025 14:36 )  1466 ng/L / x     / x     / x     / x     / x      CARDIAC MARKERS ( 2025 10:59 )  1435 ng/L / x     / x     / x     / x     / x          Total Cholesterol: 152  LDL: 92  HDL: 44  T        -------------------------------------------------------------------------------------------  Meds:  aspirin  chewable 81 milliGRAM(s) Oral daily  atorvastatin 80 milliGRAM(s) Oral at bedtime  heparin   Injectable 4000 Unit(s) IV Push every 6 hours PRN  metoprolol tartrate 12.5 milliGRAM(s) Oral two times a day  sodium chloride 0.9%. 1000 milliLiter(s) IV Continuous <Continuous>  ticagrelor 90 milliGRAM(s) Oral every 12 hours    -------------------------------------------------------------------------------------------  Cardiovascular Diagnostic Testing:    ECG: SR @80bpm w/ occasional PACs. TWI and Q waves in inferior leads    Echo:   POCUS ED: No pericardial effusion, no WMA. IVC flat w/ Respiratory variation. Preserved EF  TTE 2025: Left ventricular cavity is normal in size. EF 45-50%, mildly decreased LV systolic function. Regional WMA present. Basal and mid inferior wall, mid inferolateral segment, basal and mid inferior septum, and basal inferolateral segment are abnormal.    Stress Testing:  SPECT 2019: non-ischemic findings. Frequent VPDs. Mild inferior/basal septal wall hypokinesis. Fixed medium-sized moderate inferior/basal septal/basal inferolateral wall defects    Cath:  [facility] 2025: via RRA access w/ x2 ELMER to 100% stenotic mRCA. 30% pLAD, 40% D1 and 50% LCx stenosis  -------------------------------------------------------------------------------------------

## 2025-04-30 NOTE — PROGRESS NOTE ADULT - ATTENDING COMMENTS
69 year old man with late presentation inferior wall MI, last chest pain greater than 48 hours ago, EKG with Q waves, troponin 1400. Echo confirms inferior wall MI. Started on ACS protocol and plan coronary angiography today.    To contact call Cardiology Fellow or Attending as listed on [email or web address] password: [email or web address].
69 year old man with late presentation inferior wall MI, last chest pain greater than 48 hours ago, EKG with Q waves, troponin 1400. Echo confirms inferior wall MI. Started on ACS protocol and coronary angiography accomplished, confirm total occlusion of RCA and revascularized with stents placed. Remains without symptoms and ready for discharge on DAPT, high dose statin, beta blocker.    To contact call Cardiology Fellow or Attending as listed on [email or web address] password: [email or web address].

## 2025-04-30 NOTE — PROGRESS NOTE ADULT - ASSESSMENT
69M w/ no significant PMHx, remote smoking when pt was in his 20s p/w intermittent nonexertional, nonpleuritic, nonradiating substernal CP since 7 days ago. Sent to ED by PCP for abnormal EKG in office, s/p  load by EMS. Found to have EKG findings and cardiac marker elevation concerning for early post infarction presentation s/p LHC on 4/29/2025 w/ x2 ELMER to 100% occluded mRCA       #NSTE-ACS  Hemodynamically stable. s/p LHC w/ x2 ELMER to mRCA  Trops: 1435 > 1466  pBNP: 1675  Lipid panel: Chol 152, HDL 44, LDL 92, TG 82   EKG: SR @80bpm w/ occasional PACs. TWI and Q waves in inferior leads  POCUS ED: No pericardial effusion, no WMA. IVC flat w/ Respiratory variation. Preserved EF  SPECT 11/2019: non-ischemic findings. Frequent VPDs. Mild inferior/basal septal wall hypokinesis. Fixed medium-sized moderate inferior/basal septal/basal inferolateral wall defects  TTE 4/28/2025: Left ventricular cavity is normal in size. EF 45-50%, mildly decreased LV systolic function. Regional WMA present. Basal and mid inferior wall, mid inferolateral segment, basal and mid inferior septum, and basal inferolateral segment are abnormal.  KAVITHA: 4  CHERYL: 113  s/p  via EMS  s/p Ticagrelor 180, Heparin 4000u bolus in ED and Heparin gtt prior to revascularization  - c/w DAPT: ASA 81mg qd / Brilinta 90mg bid  - c/w  high intensity statin: Atorvastatin 80mg qd  - Switch BB to Metoprolol succinate 25mg qd  - OMT with DAPT for at least 6 months, BB and high intensity statin. Stable for discharge from cardiac perspective with strong recommendations for outpatient cardiology followup within 10 days.

## 2025-04-30 NOTE — PROGRESS NOTE ADULT - SUBJECTIVE AND OBJECTIVE BOX
Interventional Cardiology Post Cath Progress Note:                Subjective:   Patient feels well- no current complaints- Denies chest pain, shortness of breath. Denies pain, numbness, tingling or swelling around (wrist) access site    Tele 24hrs:      MEDICATIONS  (STANDING):  aspirin  chewable 81 milliGRAM(s) Oral daily  atorvastatin 80 milliGRAM(s) Oral at bedtime  metoprolol tartrate 12.5 milliGRAM(s) Oral two times a day  sodium chloride 0.9%. 1000 milliLiter(s) (100 mL/Hr) IV Continuous <Continuous>  ticagrelor 90 milliGRAM(s) Oral every 12 hours    MEDICATIONS  (PRN):  heparin   Injectable 4000 Unit(s) IV Push every 6 hours PRN For aPTT less than 40      Objective:  Vital Signs Last 24 Hrs  T(C): 36.9 (30 Apr 2025 11:17), Max: 37.2 (29 Apr 2025 19:25)  T(F): 98.4 (30 Apr 2025 11:17), Max: 98.9 (29 Apr 2025 19:25)  HR: 82 (30 Apr 2025 11:17) (73 - 102)  BP: 133/83 (30 Apr 2025 11:17) (115/68 - 139/79)  BP(mean): 97 (29 Apr 2025 19:25) (97 - 97)  RR: 18 (30 Apr 2025 11:17) (16 - 18)  SpO2: 96% (30 Apr 2025 11:17) (96% - 99%)    Parameters below as of 30 Apr 2025 11:17  Patient On (Oxygen Delivery Method): room air        04-29-25 @ 07:01  -  04-30-25 @ 07:00  --------------------------------------------------------  IN: 480 mL / OUT: 0 mL / NET: 480 mL                              12.8   9.53  )-----------( 193      ( 30 Apr 2025 06:59 )             38.5           PTT - ( 29 Apr 2025 09:40 )  PTT:48.8 sec    Physical Exam:  No apparent distress, alert and oriented times three, appropriate affect  JVD is not elevated, supple  Clear to auscultation with no wheezing, ronchi or crackles  Regular rate and rhythm with no murmur, rub or gallop  Positive bowel sounds, soft, non-tender, non-distended, no masses/guarding or rebound tenderness  Right Upper Extremity: Soft, non tender, no bleeding or hematoma, clean/dry/intact- RUE +2 palpable radial pulse      < from: TTE W or WO Ultrasound Enhancing Agent (04.28.25 @ 14:46) >      1. Left ventricular cavity is normal in size. Left ventricular systolic function is mildly decreased with an ejection fraction visually estimated at 45 to 50 %. Regional wall motion abnormalities present.   2. Basal and mid inferior wall, mid inferolateral segment, basal and mid inferior septum, and basal inferolateral segment are abnormal.   3. Normal right ventricular cavity size and normal right ventricular systolic function.   4. No significant valvular disease.   5. No pericardial effusion seen.   6. No prior echocardiogram is available for comparison.    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  DAPT for 1 year    < end of copied text >

## 2025-04-30 NOTE — DISCHARGE NOTE NURSING/CASE MANAGEMENT/SOCIAL WORK - NSDCFUADDAPPT_GEN_ALL_CORE_FT
We have provided you with a prescription for cardiac rehab which is medically supervised exercise program for your heart and has been shown to improve the quantity and quality of life of people with heart disease like yours. You should attend cardiac rehab 3 times per week for 12 weeks. We have provided you with a list of nearby facilities. Please call your insurance carrier to determine which of these facilities are covered under your plan. Please bring this prescription with you to your follow up appointment with your cardiologist who can then further assist you to enroll into a cardiac rehab program.  APPTS ARE READY TO BE MADE: [ ] YES    Best Family or Patient Contact (if needed):    Additional Information about above appointments (if needed):    1:   2:   3:     Other comments or requests:

## 2025-04-30 NOTE — DISCHARGE NOTE NURSING/CASE MANAGEMENT/SOCIAL WORK - NSDCPETBCESMAN_GEN_ALL_CORE
Render In Strict Bullet Format?: No
Continue Regimen: Rhofade and Metrogel to face (refilled today)
If you are a smoker, it is important for your health to stop smoking. Please be aware that second hand smoke is also harmful.
Detail Level: Zone
Initiate Treatment: Skin lightening cream to dark spots at night x 3 months then stop one month. Repeat as needed and wear SPF daily.

## 2025-04-30 NOTE — DISCHARGE NOTE NURSING/CASE MANAGEMENT/SOCIAL WORK - PATIENT PORTAL LINK FT
You can access the FollowMyHealth Patient Portal offered by [facility] by registering at the following website: [email or web address]. By joining [email or web address]’s FollowMyHealth portal, you will also be able to view your health information using other applications (apps) compatible with our system.

## 2025-05-01 ENCOUNTER — NON-APPOINTMENT (OUTPATIENT)
Age: 70
End: 2025-05-01

## 2025-05-01 PROBLEM — K63.5 POLYP OF COLON: Chronic | Status: ACTIVE | Noted: 2025-04-28

## 2025-05-14 ENCOUNTER — APPOINTMENT (OUTPATIENT)
Dept: CARDIOLOGY | Facility: CLINIC | Age: 70
End: 2025-05-14

## 2025-05-14 VITALS
OXYGEN SATURATION: 98 % | DIASTOLIC BLOOD PRESSURE: 73 MMHG | HEART RATE: 86 BPM | SYSTOLIC BLOOD PRESSURE: 124 MMHG | HEIGHT: 72 IN | BODY MASS INDEX: 24.24 KG/M2 | WEIGHT: 179 LBS

## 2025-05-14 DIAGNOSIS — Z78.9 OTHER SPECIFIED HEALTH STATUS: ICD-10-CM

## 2025-05-14 DIAGNOSIS — I25.10 ATHEROSCLEROTIC HEART DISEASE OF NATIVE CORONARY ARTERY W/OUT ANGINA PECTORIS: ICD-10-CM

## 2025-05-14 DIAGNOSIS — E78.5 HYPERLIPIDEMIA, UNSPECIFIED: ICD-10-CM

## 2025-05-14 PROBLEM — Z00.00 ENCOUNTER FOR PREVENTIVE HEALTH EXAMINATION: Status: ACTIVE | Noted: 2025-05-14

## 2025-05-14 PROCEDURE — G2211 COMPLEX E/M VISIT ADD ON: CPT

## 2025-05-14 PROCEDURE — 93000 ELECTROCARDIOGRAM COMPLETE: CPT

## 2025-05-14 PROCEDURE — 99215 OFFICE O/P EST HI 40 MIN: CPT

## 2025-05-14 RX ORDER — TICAGRELOR 90 MG/1
90 TABLET ORAL TWICE DAILY
Qty: 180 | Refills: 1 | Status: ACTIVE | COMMUNITY

## 2025-05-14 RX ORDER — LATANOPROST/PF 0.005 %
0.01 DROPS OPHTHALMIC (EYE)
Refills: 0 | Status: ACTIVE | COMMUNITY

## 2025-05-14 RX ORDER — ASPIRIN 81 MG/1
81 TABLET, CHEWABLE ORAL
Qty: 90 | Refills: 1 | Status: ACTIVE | COMMUNITY

## 2025-05-14 RX ORDER — ATORVASTATIN CALCIUM 80 MG/1
80 TABLET, FILM COATED ORAL
Qty: 90 | Refills: 3 | Status: ACTIVE | COMMUNITY

## 2025-05-14 RX ORDER — METOPROLOL SUCCINATE 25 MG/1
25 TABLET, EXTENDED RELEASE ORAL
Qty: 90 | Refills: 2 | Status: ACTIVE | COMMUNITY

## 2025-08-25 ENCOUNTER — NON-APPOINTMENT (OUTPATIENT)
Age: 70
End: 2025-08-25

## 2025-08-25 ENCOUNTER — APPOINTMENT (OUTPATIENT)
Dept: CARDIOLOGY | Facility: CLINIC | Age: 70
End: 2025-08-25

## 2025-08-25 VITALS
BODY MASS INDEX: 24.24 KG/M2 | WEIGHT: 179 LBS | DIASTOLIC BLOOD PRESSURE: 81 MMHG | OXYGEN SATURATION: 99 % | HEIGHT: 72 IN | HEART RATE: 70 BPM | SYSTOLIC BLOOD PRESSURE: 134 MMHG

## 2025-08-25 RX ORDER — EVOLOCUMAB 140 MG/ML
140 INJECTION, SOLUTION SUBCUTANEOUS
Qty: 2 | Refills: 3 | Status: ACTIVE | COMMUNITY
Start: 2025-08-25 | End: 1900-01-01

## 2025-08-25 RX ORDER — ALIROCUMAB 150 MG/ML
150 INJECTION, SOLUTION SUBCUTANEOUS
Qty: 3 | Refills: 3 | Status: DISCONTINUED | COMMUNITY
Start: 2025-08-25 | End: 2025-08-25

## 2025-08-25 RX ORDER — LATANOPROST OPHTHALMIC SOLUTION 0.005% 50 UG/ML
0.01 SOLUTION/ DROPS TOPICAL
Qty: 90 | Refills: 0 | Status: ACTIVE | COMMUNITY
Start: 2025-05-30

## 2025-08-27 ENCOUNTER — APPOINTMENT (OUTPATIENT)
Dept: INTERNAL MEDICINE | Facility: CLINIC | Age: 70
End: 2025-08-27

## 2025-08-27 ENCOUNTER — OUTPATIENT (OUTPATIENT)
Dept: OUTPATIENT SERVICES | Facility: HOSPITAL | Age: 70
LOS: 1 days | End: 2025-08-27

## (undated) DEVICE — ELCTR GROUNDING PAD ADULT COVIDIEN

## (undated) DEVICE — ESU ERBE 044847: Type: DURABLE MEDICAL EQUIPMENT

## (undated) DEVICE — GOWN LG

## (undated) DEVICE — POLY TRAP ETRAP

## (undated) DEVICE — BIOPSY FORCEP COLD DISP

## (undated) DEVICE — ELCTR ECG CONDUCTIVE ADHESIVE

## (undated) DEVICE — DRSG CURITY GAUZE SPONGE 4 X 4" 12-PLY NON-STERILE

## (undated) DEVICE — DRSG BANDAID 0.75X3"

## (undated) DEVICE — TUBING IV SET GRAVITY 3Y 100" MACRO

## (undated) DEVICE — SALIVA EJECTOR (BLUE)

## (undated) DEVICE — SNARE DISP

## (undated) DEVICE — TUBING MEDI-VAC W MAXIGRIP CONNECTORS 1/4"X6'

## (undated) DEVICE — TUBING SUCTION NONCONDUCTIVE 6MM X 12FT

## (undated) DEVICE — BASIN EMESIS 10IN GRADUATED MAUVE

## (undated) DEVICE — LINE BREATHE SAMPLNG

## (undated) DEVICE — LUBRICATING JELLY HR ONE SHOT 3G

## (undated) DEVICE — PACK IV START WITH CHG

## (undated) DEVICE — BIOPSY FORCEP RADIAL JAW 4 STANDARD WITH NEEDLE

## (undated) DEVICE — CONTAINER FORMALIN 80ML YELLOW

## (undated) DEVICE — CATH IV SAFE BC 22G X 1" (BLUE)

## (undated) DEVICE — FACESHIELD FULL VISOR

## (undated) DEVICE — DRSG 2X2